# Patient Record
Sex: FEMALE | Race: BLACK OR AFRICAN AMERICAN | NOT HISPANIC OR LATINO
[De-identification: names, ages, dates, MRNs, and addresses within clinical notes are randomized per-mention and may not be internally consistent; named-entity substitution may affect disease eponyms.]

---

## 2016-12-17 RX ORDER — NAPROXEN SODIUM 275 MG
1 TABLET ORAL
Qty: 14 | Refills: 0 | DISCHARGE
Start: 2016-12-17 | End: 2016-12-24

## 2018-04-26 ENCOUNTER — RESULT REVIEW (OUTPATIENT)
Age: 58
End: 2018-04-26

## 2020-04-25 ENCOUNTER — MESSAGE (OUTPATIENT)
Age: 60
End: 2020-04-25

## 2020-05-07 LAB
SARS-COV-2 IGG SERPL IA-ACNC: <0.1 INDEX
SARS-COV-2 IGG SERPL QL IA: NEGATIVE

## 2020-10-23 ENCOUNTER — RESULT REVIEW (OUTPATIENT)
Age: 60
End: 2020-10-23

## 2021-01-30 ENCOUNTER — APPOINTMENT (OUTPATIENT)
Dept: MAMMOGRAPHY | Facility: IMAGING CENTER | Age: 61
End: 2021-01-30
Payer: COMMERCIAL

## 2021-01-30 ENCOUNTER — OUTPATIENT (OUTPATIENT)
Dept: OUTPATIENT SERVICES | Facility: HOSPITAL | Age: 61
LOS: 1 days | End: 2021-01-30
Payer: COMMERCIAL

## 2021-01-30 DIAGNOSIS — Z00.8 ENCOUNTER FOR OTHER GENERAL EXAMINATION: ICD-10-CM

## 2021-01-30 PROCEDURE — 76641 ULTRASOUND BREAST COMPLETE: CPT | Mod: 26,50

## 2021-01-30 PROCEDURE — 77067 SCR MAMMO BI INCL CAD: CPT | Mod: 26

## 2021-01-30 PROCEDURE — 77067 SCR MAMMO BI INCL CAD: CPT

## 2021-01-30 PROCEDURE — 76641 ULTRASOUND BREAST COMPLETE: CPT

## 2021-01-30 PROCEDURE — 77063 BREAST TOMOSYNTHESIS BI: CPT | Mod: 26

## 2021-01-30 PROCEDURE — 77063 BREAST TOMOSYNTHESIS BI: CPT

## 2021-02-04 ENCOUNTER — APPOINTMENT (OUTPATIENT)
Dept: MAMMOGRAPHY | Facility: IMAGING CENTER | Age: 61
End: 2021-02-04

## 2022-02-01 ENCOUNTER — TRANSCRIPTION ENCOUNTER (OUTPATIENT)
Age: 62
End: 2022-02-01

## 2022-11-21 ENCOUNTER — APPOINTMENT (OUTPATIENT)
Dept: MAMMOGRAPHY | Facility: IMAGING CENTER | Age: 62
End: 2022-11-21

## 2022-11-21 ENCOUNTER — OUTPATIENT (OUTPATIENT)
Dept: OUTPATIENT SERVICES | Facility: HOSPITAL | Age: 62
LOS: 1 days | End: 2022-11-21
Payer: COMMERCIAL

## 2022-11-21 ENCOUNTER — APPOINTMENT (OUTPATIENT)
Dept: ULTRASOUND IMAGING | Facility: IMAGING CENTER | Age: 62
End: 2022-11-21

## 2022-11-21 DIAGNOSIS — Z00.8 ENCOUNTER FOR OTHER GENERAL EXAMINATION: ICD-10-CM

## 2022-11-21 PROCEDURE — 76641 ULTRASOUND BREAST COMPLETE: CPT | Mod: 26,50

## 2022-11-21 PROCEDURE — 77067 SCR MAMMO BI INCL CAD: CPT

## 2022-11-21 PROCEDURE — 77063 BREAST TOMOSYNTHESIS BI: CPT

## 2022-11-21 PROCEDURE — 77063 BREAST TOMOSYNTHESIS BI: CPT | Mod: 26

## 2022-11-21 PROCEDURE — 76641 ULTRASOUND BREAST COMPLETE: CPT

## 2022-11-21 PROCEDURE — 77067 SCR MAMMO BI INCL CAD: CPT | Mod: 26

## 2022-12-07 ENCOUNTER — APPOINTMENT (OUTPATIENT)
Dept: MAMMOGRAPHY | Facility: IMAGING CENTER | Age: 62
End: 2022-12-07

## 2022-12-07 ENCOUNTER — APPOINTMENT (OUTPATIENT)
Dept: ULTRASOUND IMAGING | Facility: IMAGING CENTER | Age: 62
End: 2022-12-07

## 2022-12-07 ENCOUNTER — OUTPATIENT (OUTPATIENT)
Dept: OUTPATIENT SERVICES | Facility: HOSPITAL | Age: 62
LOS: 1 days | End: 2022-12-07
Payer: COMMERCIAL

## 2022-12-07 DIAGNOSIS — Z00.8 ENCOUNTER FOR OTHER GENERAL EXAMINATION: ICD-10-CM

## 2022-12-07 PROCEDURE — 76642 ULTRASOUND BREAST LIMITED: CPT | Mod: 26,RT

## 2022-12-07 PROCEDURE — G0279: CPT | Mod: 26

## 2022-12-07 PROCEDURE — G0279: CPT

## 2022-12-07 PROCEDURE — 77065 DX MAMMO INCL CAD UNI: CPT | Mod: 26,RT

## 2022-12-07 PROCEDURE — 77065 DX MAMMO INCL CAD UNI: CPT

## 2022-12-07 PROCEDURE — 76642 ULTRASOUND BREAST LIMITED: CPT

## 2022-12-16 ENCOUNTER — OUTPATIENT (OUTPATIENT)
Dept: OUTPATIENT SERVICES | Facility: HOSPITAL | Age: 62
LOS: 1 days | End: 2022-12-16
Payer: COMMERCIAL

## 2022-12-16 ENCOUNTER — APPOINTMENT (OUTPATIENT)
Dept: ULTRASOUND IMAGING | Facility: IMAGING CENTER | Age: 62
End: 2022-12-16
Payer: COMMERCIAL

## 2022-12-16 DIAGNOSIS — Z00.8 ENCOUNTER FOR OTHER GENERAL EXAMINATION: ICD-10-CM

## 2022-12-16 PROCEDURE — A4648: CPT

## 2022-12-16 PROCEDURE — 10035 PLMT SFT TISS LOCLZJ DEV 1ST: CPT | Mod: RT

## 2022-12-16 PROCEDURE — 19083 BX BREAST 1ST LESION US IMAG: CPT | Mod: RT

## 2022-12-16 PROCEDURE — 88305 TISSUE EXAM BY PATHOLOGIST: CPT

## 2022-12-16 PROCEDURE — 77065 DX MAMMO INCL CAD UNI: CPT | Mod: 26,RT

## 2022-12-16 PROCEDURE — 77065 DX MAMMO INCL CAD UNI: CPT

## 2022-12-16 PROCEDURE — 88305 TISSUE EXAM BY PATHOLOGIST: CPT | Mod: 26

## 2022-12-16 PROCEDURE — 38505 NEEDLE BIOPSY LYMPH NODES: CPT | Mod: RT

## 2022-12-16 PROCEDURE — 19084 BX BREAST ADD LESION US IMAG: CPT

## 2022-12-16 PROCEDURE — 19084 BX BREAST ADD LESION US IMAG: CPT | Mod: RT

## 2022-12-16 PROCEDURE — 19083 BX BREAST 1ST LESION US IMAG: CPT

## 2023-03-24 ENCOUNTER — OUTPATIENT (OUTPATIENT)
Dept: OUTPATIENT SERVICES | Facility: HOSPITAL | Age: 63
LOS: 1 days | End: 2023-03-24
Payer: COMMERCIAL

## 2023-03-24 ENCOUNTER — APPOINTMENT (OUTPATIENT)
Dept: ULTRASOUND IMAGING | Facility: IMAGING CENTER | Age: 63
End: 2023-03-24

## 2023-03-24 DIAGNOSIS — Z00.8 ENCOUNTER FOR OTHER GENERAL EXAMINATION: ICD-10-CM

## 2023-03-24 PROCEDURE — 76830 TRANSVAGINAL US NON-OB: CPT | Mod: 26

## 2023-03-24 PROCEDURE — 76856 US EXAM PELVIC COMPLETE: CPT

## 2023-03-24 PROCEDURE — 76830 TRANSVAGINAL US NON-OB: CPT

## 2023-03-24 PROCEDURE — 76856 US EXAM PELVIC COMPLETE: CPT | Mod: 26

## 2023-03-24 PROCEDURE — 76700 US EXAM ABDOM COMPLETE: CPT

## 2023-03-24 PROCEDURE — 76700 US EXAM ABDOM COMPLETE: CPT | Mod: 26

## 2023-09-07 ENCOUNTER — NON-APPOINTMENT (OUTPATIENT)
Age: 63
End: 2023-09-07

## 2023-12-11 ENCOUNTER — APPOINTMENT (OUTPATIENT)
Dept: ULTRASOUND IMAGING | Facility: IMAGING CENTER | Age: 63
End: 2023-12-11
Payer: COMMERCIAL

## 2023-12-11 ENCOUNTER — APPOINTMENT (OUTPATIENT)
Dept: RADIOLOGY | Facility: IMAGING CENTER | Age: 63
End: 2023-12-11
Payer: COMMERCIAL

## 2023-12-11 ENCOUNTER — OUTPATIENT (OUTPATIENT)
Dept: OUTPATIENT SERVICES | Facility: HOSPITAL | Age: 63
LOS: 1 days | End: 2023-12-11
Payer: COMMERCIAL

## 2023-12-11 ENCOUNTER — APPOINTMENT (OUTPATIENT)
Dept: MAMMOGRAPHY | Facility: IMAGING CENTER | Age: 63
End: 2023-12-11
Payer: COMMERCIAL

## 2023-12-11 DIAGNOSIS — Z12.31 ENCOUNTER FOR SCREENING MAMMOGRAM FOR MALIGNANT NEOPLASM OF BREAST: ICD-10-CM

## 2023-12-11 DIAGNOSIS — Z13.820 ENCOUNTER FOR SCREENING FOR OSTEOPOROSIS: ICD-10-CM

## 2023-12-11 DIAGNOSIS — R92.8 OTHER ABNORMAL AND INCONCLUSIVE FINDINGS ON DIAGNOSTIC IMAGING OF BREAST: ICD-10-CM

## 2023-12-11 PROCEDURE — 77080 DXA BONE DENSITY AXIAL: CPT | Mod: 26

## 2023-12-11 PROCEDURE — 77066 DX MAMMO INCL CAD BI: CPT

## 2023-12-11 PROCEDURE — 76641 ULTRASOUND BREAST COMPLETE: CPT

## 2023-12-11 PROCEDURE — 77080 DXA BONE DENSITY AXIAL: CPT

## 2023-12-11 PROCEDURE — 77066 DX MAMMO INCL CAD BI: CPT | Mod: 26

## 2023-12-11 PROCEDURE — 76641 ULTRASOUND BREAST COMPLETE: CPT | Mod: 26,50

## 2023-12-11 PROCEDURE — G0279: CPT | Mod: 26

## 2023-12-11 PROCEDURE — G0279: CPT

## 2023-12-20 ENCOUNTER — NON-APPOINTMENT (OUTPATIENT)
Age: 63
End: 2023-12-20

## 2024-04-12 ENCOUNTER — NON-APPOINTMENT (OUTPATIENT)
Age: 64
End: 2024-04-12

## 2024-06-24 ENCOUNTER — APPOINTMENT (OUTPATIENT)
Dept: ORTHOPEDIC SURGERY | Facility: CLINIC | Age: 64
End: 2024-06-24
Payer: COMMERCIAL

## 2024-06-24 VITALS — HEIGHT: 66 IN | BODY MASS INDEX: 36.96 KG/M2 | WEIGHT: 230 LBS

## 2024-06-24 DIAGNOSIS — E78.00 PURE HYPERCHOLESTEROLEMIA, UNSPECIFIED: ICD-10-CM

## 2024-06-24 DIAGNOSIS — Z87.891 PERSONAL HISTORY OF NICOTINE DEPENDENCE: ICD-10-CM

## 2024-06-24 DIAGNOSIS — I10 ESSENTIAL (PRIMARY) HYPERTENSION: ICD-10-CM

## 2024-06-24 DIAGNOSIS — R73.03 PREDIABETES.: ICD-10-CM

## 2024-06-24 DIAGNOSIS — M51.36 OTHER INTERVERTEBRAL DISC DEGENERATION, LUMBAR REGION: ICD-10-CM

## 2024-06-24 DIAGNOSIS — M54.12 RADICULOPATHY, CERVICAL REGION: ICD-10-CM

## 2024-06-24 PROCEDURE — 72040 X-RAY EXAM NECK SPINE 2-3 VW: CPT

## 2024-06-24 PROCEDURE — 99204 OFFICE O/P NEW MOD 45 MIN: CPT

## 2024-06-24 PROCEDURE — 72110 X-RAY EXAM L-2 SPINE 4/>VWS: CPT

## 2024-06-24 RX ORDER — AMLODIPINE BESYLATE 5 MG/1
TABLET ORAL
Refills: 0 | Status: ACTIVE | COMMUNITY

## 2024-06-24 RX ORDER — LOVASTATIN 40 MG/1
TABLET ORAL
Refills: 0 | Status: ACTIVE | COMMUNITY

## 2024-06-24 RX ORDER — METHYLPREDNISOLONE 4 MG/1
4 TABLET ORAL
Qty: 1 | Refills: 0 | Status: ACTIVE | COMMUNITY
Start: 2024-06-24 | End: 1900-01-01

## 2024-06-27 ENCOUNTER — APPOINTMENT (OUTPATIENT)
Dept: MRI IMAGING | Facility: CLINIC | Age: 64
End: 2024-06-27
Payer: COMMERCIAL

## 2024-06-27 PROCEDURE — 72148 MRI LUMBAR SPINE W/O DYE: CPT

## 2024-06-27 PROCEDURE — 72141 MRI NECK SPINE W/O DYE: CPT

## 2024-07-03 ENCOUNTER — APPOINTMENT (OUTPATIENT)
Dept: PHYSICAL MEDICINE AND REHAB | Facility: CLINIC | Age: 64
End: 2024-07-03

## 2024-07-08 ENCOUNTER — APPOINTMENT (OUTPATIENT)
Dept: ORTHOPEDIC SURGERY | Facility: CLINIC | Age: 64
End: 2024-07-08
Payer: COMMERCIAL

## 2024-07-08 VITALS — HEIGHT: 66 IN | WEIGHT: 230 LBS | BODY MASS INDEX: 36.96 KG/M2

## 2024-07-08 DIAGNOSIS — M54.12 RADICULOPATHY, CERVICAL REGION: ICD-10-CM

## 2024-07-08 DIAGNOSIS — M51.36 OTHER INTERVERTEBRAL DISC DEGENERATION, LUMBAR REGION: ICD-10-CM

## 2024-07-08 PROCEDURE — 99213 OFFICE O/P EST LOW 20 MIN: CPT

## 2024-07-08 RX ORDER — CYCLOBENZAPRINE HYDROCHLORIDE 10 MG/1
10 TABLET, FILM COATED ORAL
Qty: 30 | Refills: 1 | Status: ACTIVE | COMMUNITY
Start: 2024-07-08 | End: 1900-01-01

## 2024-07-20 ENCOUNTER — NON-APPOINTMENT (OUTPATIENT)
Age: 64
End: 2024-07-20

## 2024-07-22 ENCOUNTER — TRANSCRIPTION ENCOUNTER (OUTPATIENT)
Age: 64
End: 2024-07-22

## 2024-08-24 ENCOUNTER — NON-APPOINTMENT (OUTPATIENT)
Age: 64
End: 2024-08-24

## 2024-08-26 ENCOUNTER — APPOINTMENT (OUTPATIENT)
Dept: ORTHOPEDIC SURGERY | Facility: CLINIC | Age: 64
End: 2024-08-26
Payer: COMMERCIAL

## 2024-08-26 VITALS — BODY MASS INDEX: 36.96 KG/M2 | WEIGHT: 230 LBS | HEIGHT: 66 IN

## 2024-08-26 DIAGNOSIS — M51.36 OTHER INTERVERTEBRAL DISC DEGENERATION, LUMBAR REGION: ICD-10-CM

## 2024-08-26 PROCEDURE — 99213 OFFICE O/P EST LOW 20 MIN: CPT

## 2024-08-26 RX ORDER — MELOXICAM 15 MG/1
15 TABLET ORAL DAILY
Qty: 30 | Refills: 0 | Status: ACTIVE | COMMUNITY
Start: 2024-08-26 | End: 1900-01-01

## 2024-08-26 NOTE — HISTORY OF PRESENT ILLNESS
[Neck] : neck [Lower back] : lower back [0] : 0 [] : yes [de-identified] : 08/26/24: Patient here for neck and lower back. Patient is in a lot of pain today, especially in the neck area. States that it has gotten worse since her last visit.  07/08/2024: MRI review of cervical and lumbar spine today. Took MDP with some relief. Reports no pain today.   06/24/2024: 64-year-old LHD female presenting with neck, upper and lower back pain.  Patient with !one month history of left sided neck pain that radiates to scapular and LUE. Patient reports having pain into left hand. Denies change in dexterity or fine motor skills. Patient went to , ED and followed up with PCP, who prescribed flexeril and naproxen which is helpful briefly. Patient with chronic low back pain and BLE radicular pain that has been managed with PT, LESI and muscle relaxants. Patient reports symptoms she continues to have persistent low back pain that continues. Patient does reports buckling at times when walking long distances. Denies radicular pain, n/t. Denies changes in b/b function.   PmHX: HTN, HLD, Pre-DM (A1c 6.3) All: NKDA Occupation: Phlebotomist

## 2024-08-26 NOTE — DATA REVIEWED
[Cervical Spine] : cervical spine [MRI] : MRI [Lumbar Spine] : lumbar spine [I independently reviewed and interpreted images and report] : I independently reviewed and interpreted images and report [FreeTextEntry1] : multilevel degen change with cord compression at  C32-4 without cord signal change. Multilevel foraminal stenosis.  [FreeTextEntry2] : multilevel degen changes in lower lumbar spine with moderate to sever snteosit. L3-4 spondylolisthesis

## 2024-08-26 NOTE — DATA REVIEWED
[Cervical Spine] : cervical spine [MRI] : MRI [Lumbar Spine] : lumbar spine [I independently reviewed and interpreted images and report] : I independently reviewed and interpreted images and report [FreeTextEntry1] : multilevel degen change with cord compression at  C32-4 without cord signal change. Multilevel foraminal stenosis.  [FreeTextEntry2] : multilevel degen changes in lower lumbar spine with moderate to sever snteosit. L3-4 spondylolisthesis Patient requests all Lab and Radiology Results on their Discharge Instructions

## 2024-08-26 NOTE — IMAGING
[Facet arthropathy] : Facet arthropathy [Disc space narrowing] : Disc space narrowing [de-identified] : Spine: Inspection/Palpation: No tenderness to palpation throughout Cervical/thoracic/lumbar spine. No bony stepoffs, No lesions.   Gait: Non-antalgic,     Neurologic: Bilateral upper extremities 5/5 Deltoid/Biceps/Triceps/ Wrist Flexion/Wrist Extension/ / Intrinsics Except Bilateral Lower Extremities 5/5 Iliopsoas/Quadriceps/Hamstrings/ Tibialis Anterior/ Gastrocnemius. Extensor Hallucis Longus/ Flexor Hallucis Longus except   Sensation intact to light touch C5-T1 Sensation intact to light touch L2-S1    Negative Heart's,   [FreeTextEntry1] : degen changes worst at L4-5 and L5-s1

## 2024-08-26 NOTE — HISTORY OF PRESENT ILLNESS
[Neck] : neck [Lower back] : lower back [0] : 0 [] : yes [de-identified] : 08/26/24: Patient here for neck and lower back. Patient is in a lot of pain today, especially in the neck area. States that it has gotten worse since her last visit.  07/08/2024: MRI review of cervical and lumbar spine today. Took MDP with some relief. Reports no pain today.   06/24/2024: 64-year-old LHD female presenting with neck, upper and lower back pain.  Patient with !one month history of left sided neck pain that radiates to scapular and LUE. Patient reports having pain into left hand. Denies change in dexterity or fine motor skills. Patient went to , ED and followed up with PCP, who prescribed flexeril and naproxen which is helpful briefly. Patient with chronic low back pain and BLE radicular pain that has been managed with PT, LESI and muscle relaxants. Patient reports symptoms she continues to have persistent low back pain that continues. Patient does reports buckling at times when walking long distances. Denies radicular pain, n/t. Denies changes in b/b function.   PmHX: HTN, HLD, Pre-DM (A1c 6.3) All: NKDA Occupation: Phlebotomist

## 2024-08-26 NOTE — ASSESSMENT
[FreeTextEntry1] : 64F with neck pain  and LUE radic likely C7 and Low back pain and spasm 'Has cord compression but no symptoms from it. Only radicular complaints. Discussed will need surveillance MRI in 1 year and if she develops any signs of myelopathy would require a decompression.  PT c/w nsaids has rx from pmd.   Discussed major side effects of medication including but not limited to gastritis and acute kidney injury.  She was instructed to take with food and to discontinue use if stomach or esophageal pain developed.  Pain management referral for PAMELA

## 2024-08-26 NOTE — IMAGING
[Facet arthropathy] : Facet arthropathy [Disc space narrowing] : Disc space narrowing [de-identified] : Spine: Inspection/Palpation: No tenderness to palpation throughout Cervical/thoracic/lumbar spine. No bony stepoffs, No lesions.   Gait: Non-antalgic,     Neurologic: Bilateral upper extremities 5/5 Deltoid/Biceps/Triceps/ Wrist Flexion/Wrist Extension/ / Intrinsics Except Bilateral Lower Extremities 5/5 Iliopsoas/Quadriceps/Hamstrings/ Tibialis Anterior/ Gastrocnemius. Extensor Hallucis Longus/ Flexor Hallucis Longus except   Sensation intact to light touch C5-T1 Sensation intact to light touch L2-S1    Negative Heart's,   [FreeTextEntry1] : degen changes worst at L4-5 and L5-s1

## 2024-08-28 ENCOUNTER — APPOINTMENT (OUTPATIENT)
Dept: PAIN MANAGEMENT | Facility: CLINIC | Age: 64
End: 2024-08-28
Payer: COMMERCIAL

## 2024-08-28 VITALS — WEIGHT: 228 LBS | HEIGHT: 66 IN | BODY MASS INDEX: 36.64 KG/M2

## 2024-08-28 DIAGNOSIS — M54.12 RADICULOPATHY, CERVICAL REGION: ICD-10-CM

## 2024-08-28 PROCEDURE — 99204 OFFICE O/P NEW MOD 45 MIN: CPT

## 2024-08-28 RX ORDER — TRAMADOL HYDROCHLORIDE 50 MG/1
50 TABLET, COATED ORAL
Qty: 28 | Refills: 0 | Status: ACTIVE | COMMUNITY
Start: 2024-08-28 | End: 1900-01-01

## 2024-08-28 NOTE — HISTORY OF PRESENT ILLNESS
[Neck] : neck [Upper back] : upper back [9] : 9 [Radiating] : radiating [Sharp] : sharp [Shooting] : shooting [Tightness] : tightness [Constant] : constant [Household chores] : household chores [Leisure] : leisure [Sleep] : sleep [Nothing helps with pain getting better] : Nothing helps with pain getting better [Sitting] : sitting [Standing] : standing [Lying in bed] : lying in bed [] : no [FreeTextEntry1] : L shoulder  [de-identified] : x-ray and MRI

## 2024-08-28 NOTE — DATA REVIEWED
[MRI] : MRI [Cervical Spine] : cervical spine [Report was reviewed and noted in the chart] : The report was reviewed and noted in the chart [I independently reviewed and interpreted images and report] : I independently reviewed and interpreted images and report [FreeTextEntry1] : 6/27/2024 MRI Cervical Spine O&C C3-C4: broad based posterior disc herniation, moderate cord compression asymmetric on the RIGHT, bilateral exiting nerve root impingement w/ severe LEFT NF narrowing and moderate to severe right NF narrowing C4-C5: disc bulging, uncovertebral hypertrophy, moderate bilateral NF narrowing C5-C6: broad based posterior disc herniation encroaching on the right > left C6 nerve roots w/ mild central stenosis C6-C7: disc bulging, broad based disc herniation w/ mild central stenosis and encroachment on LEFT > right C7 nerve roots

## 2024-08-28 NOTE — PHYSICAL EXAM
[de-identified] : Gen: NAD Head: NC/AT Neck: patient with +spurling's on the LEFT, +myofascial TTP in the left suprascapular region  Eyes: wears glasses, no scleral icterus ENT: mucous membranes moist CV: no JVD Lungs: nonlabored breathing Abd: soft, NT/ND Ext: full ROM in all extremities, no peripheral edema Neuro: CN intact UEs +5 L +5 R shoulder abduction +5 L +5 R arm abduction +5 L +5 R forearm flexion +5 L +5 R forearm extension +5 L +5 R finger flexion +5 L +5 R  strength Psych: normal affect Skin: no visible lesions

## 2024-08-28 NOTE — DISCUSSION/SUMMARY
[de-identified] : DEMIAN RHOADES is a 64 year-old woman presenting for a NPV for a history of chronic neck pain.   Prior treatment: Meloxicam  Cyclobenzaprine TPI Oral steroid taper Tramadol Heat, rest, ice  Plan: 1) MRI cervical spine images reviewed with the patient. 2) Schedule C7-T1 MICHI w/ MAC. The procedure was explained to the patient in detail. Reviewed risks, benefits, and alternatives with the patient. Some risks discussed included temporary increase in pain, bleeding, infection, and side effects from steroids. The patient expressed understanding and would like to proceed.  3) [place]STOP PDMP was checked and appropriate. No opioid prescriptions, but patient gets medical marijuana.  4) ONE TIME prescription for tramadol 50mg #28 tabs 5) Discussed with the patient the risks and benefits from opioid use. Patient was instructed not to combine this medication with sedative medications, illicit drugs, or alcohol. Discussed with the patient that I WILL NOT write them for this medication chronically at this time and that this is a one-time prescription for their acute pain. 6) RTC post procedure

## 2024-09-11 ENCOUNTER — APPOINTMENT (OUTPATIENT)
Dept: PAIN MANAGEMENT | Facility: CLINIC | Age: 64
End: 2024-09-11
Payer: COMMERCIAL

## 2024-09-11 ENCOUNTER — APPOINTMENT (OUTPATIENT)
Dept: PAIN MANAGEMENT | Facility: CLINIC | Age: 64
End: 2024-09-11

## 2024-09-11 DIAGNOSIS — M54.12 RADICULOPATHY, CERVICAL REGION: ICD-10-CM

## 2024-09-11 PROCEDURE — 62321 NJX INTERLAMINAR CRV/THRC: CPT

## 2024-09-11 NOTE — PROCEDURE
[FreeTextEntry1] : C7-T1 cervical interlaminar epidural steroid injection [FreeTextEntry2] : chronic cervical radiculopathy [FreeTextEntry3] : Procedure Date: 09/11/2024   Preoperative Diagnosis: cervical radiculopathy   Procedure: C7-T1 epidural steroid injection under fluoroscopic guidance   Anesthesia: MAC   Complications: none   EBL: none   Procedure in detail: Patient was seen and examined. Risks, benefits, and alternatives for the procedure were discussed with the patient in detail. The patient expressed understanding, gave written and verbal consent, and placed themselves in a prone position on the procedure table. Skin overlying the posterior cervical spine was prepped with chloraprep and draped in the usual sterile fashion. Fluoroscopic images were obtained to identify the C7 and T1 vertebral body. Target was the interlaminar space between the C7 and T1 vertebral body. Skin overlying the target was marked and infiltrated with 1% lidocaine. Using an 20 gauge, 4.5 inch tuohy needle, this was inserted and advanced under intermittent fluoroscopic guidance. When felt to be engaged in the ligamentum flavum, contralateral oblique view was used to confirm depth. Needle then advanced using loss of resistance to saline technique until loss was achieved. After negative aspiration for heme/csf, contrast was injected under live fluoroscopy, which showed contrast spread consistent with epidural flow. No evidence of intravascular or intrathecal uptake. At this point, a total of 2ml of injectate, which consisted of 1ml of 80mg/ml depomedrol and 1ml of normal saline was injected. The needle was restyletted and withdrawn, a band aid was placed over the injection site. Patient tolerated the procedure well. The patient recovered uneventfully and was discharged home in stable condition.

## 2024-09-14 NOTE — HISTORY OF PRESENT ILLNESS
[Neck] : neck [Upper back] : upper back [9] : 9 [Radiating] : radiating [Sharp] : sharp [Shooting] : shooting [Tightness] : tightness [Constant] : constant [Household chores] : household chores [Leisure] : leisure [Sleep] : sleep [Nothing helps with pain getting better] : Nothing helps with pain getting better [Sitting] : sitting [Standing] : standing [Lying in bed] : lying in bed [] : no [FreeTextEntry1] : L shoulder  [de-identified] : x-ray and MRI

## 2024-09-14 NOTE — DISCUSSION/SUMMARY
[de-identified] : DEMIAN RHOADES is a 64 year-old woman presenting for a RPV for a history of chronic neck pain.   Prior treatment: Meloxicam  Cyclobenzaprine TPI Oral steroid taper Tramadol Heat, rest, ice  Plan: 1) MRI cervical spine images reviewed with the patient. 2) Schedule C7-T1 MICHI w/ MAC. The procedure was explained to the patient in detail. Reviewed risks, benefits, and alternatives with the patient. Some risks discussed included temporary increase in pain, bleeding, infection, and side effects from steroids. The patient expressed understanding and would like to proceed.  3) [place]STOP PDMP was checked and appropriate. No opioid prescriptions, but patient gets medical marijuana.  4) ONE TIME prescription for tramadol 50mg #28 tabs 5) Discussed with the patient the risks and benefits from opioid use. Patient was instructed not to combine this medication with sedative medications, illicit drugs, or alcohol. Discussed with the patient that I WILL NOT write them for this medication chronically at this time and that this is a one-time prescription for their acute pain. 6) RTC post procedure

## 2024-09-14 NOTE — PHYSICAL EXAM
[de-identified] : Gen: NAD Head: NC/AT Neck: patient with +spurling's on the LEFT, +myofascial TTP in the left suprascapular region  Eyes: wears glasses, no scleral icterus ENT: mucous membranes moist CV: no JVD Lungs: nonlabored breathing Abd: soft, NT/ND Ext: full ROM in all extremities, no peripheral edema Neuro: CN intact UEs +5 L +5 R shoulder abduction +5 L +5 R arm abduction +5 L +5 R forearm flexion +5 L +5 R forearm extension +5 L +5 R finger flexion +5 L +5 R  strength Psych: normal affect Skin: no visible lesions

## 2024-09-25 ENCOUNTER — APPOINTMENT (OUTPATIENT)
Dept: PAIN MANAGEMENT | Facility: CLINIC | Age: 64
End: 2024-09-25
Payer: COMMERCIAL

## 2024-09-25 VITALS — WEIGHT: 228 LBS | HEIGHT: 66 IN | BODY MASS INDEX: 36.64 KG/M2

## 2024-09-25 DIAGNOSIS — M54.12 RADICULOPATHY, CERVICAL REGION: ICD-10-CM

## 2024-09-25 PROCEDURE — 99214 OFFICE O/P EST MOD 30 MIN: CPT

## 2024-09-25 NOTE — PHYSICAL EXAM
[de-identified] : Gen: NAD Neck: +spurling's bilaterally Head: NC/AT Eyes: wears glasses, no scleral icterus ENT: mucous membranes moist CV: RRR, S1 S2, no mrg Lungs: CTAB, nonlabored breathing Abd: soft, NT/ND Ext: full ROM in all extremities, no peripheral edema Back: +TTP in the bilateral lumbar facet region, +facet loading on the left, +SLR bilaterally Neuro: CN intact UEs +5 L +5 R shoulder abduction +5 L +5 R arm abduction +5 L +5 R forearm flexion +5 L +5 R forearm extension +5 L +5 R finger flexion +5 L +5 R  strength LEs +5 L +5 R hip flexion +5 L +5 R leg extension +5 L +5 R leg flexion +5 L +5 R foot dorsiflexion +5 L +5 R foot plantarflexion +5 L +5 R EHL extension Psych: normal affect Skin: no visible lesions

## 2024-09-25 NOTE — DATA REVIEWED
[Cervical Spine] : cervical spine [MRI] : MRI [Lumbar Spine] : lumbar spine [Report was reviewed and noted in the chart] : The report was reviewed and noted in the chart [I independently reviewed and interpreted images and report] : I independently reviewed and interpreted images and report [FreeTextEntry1] : 6/27/2024 MRI Lumbar Spine O&C L2-L3: broad based disc herniation, moderate to severe bilateral facet arthrosis, moderate central stenosis, bilateral exiting L2 nerve root impingement L3-L4: broad based disc herniation, anterolisthesis, moderate to severe central stenosis bilateral facet arthrosis, moderate central stenosis, bilateral exiting L3 nerve root impingement RIGHT > left L4-L5: disc bulge, facet arthrosis, moderate central stenosis, bilateral L4 and L5 nerve root impingement L5-S1: disc bulging, facet arthrosis, posterior disc herniation, bilateral L5 and S1 nerve root impingement   6/27/2024 MRI Cervical Spine O&C C3-C4: broad based posterior disc herniation, moderate cord compression asymmetric on the RIGHT, bilateral exiting nerve root impingement w/ severe LEFT NF narrowing and moderate to severe right NF narrowing C4-C5: disc bulging, uncovertebral hypertrophy, moderate bilateral NF narrowing C5-C6: broad based posterior disc herniation encroaching on the right > left C6 nerve roots w/ mild central stenosis C6-C7: disc bulging, broad based disc herniation w/ mild central stenosis and encroachment on LEFT > right C7 nerve roots

## 2024-09-25 NOTE — DISCUSSION/SUMMARY
[de-identified] : DEMIAN RHOADES is a 64 year-old woman presenting for a RPV for a history of chronic neck pain.   Prior treatment: 9/11/2024 C7-T1 MICHI w/ MAC w/ 90% improvement of pain and function  Meloxicam  Cyclobenzaprine TPI Oral steroid taper Tramadol Heat, rest, ice  Plan: 1) MRI cervical/lumbar spine images reviewed with the patient. 2) Schedule L5-S1 ILESI w/ MAC. The procedure was explained to the patient in detail. Reviewed risks, benefits, and alternatives with the patient. Some risks discussed included temporary increase in pain, bleeding, infection, and side effects from steroids. The patient expressed understanding and would like to proceed. 3) Consider repeat C7-T1 MICHI w/ MAC in the future 4) Continue home exercise regimen 5) RTC post procedure

## 2024-09-25 NOTE — DISCUSSION/SUMMARY
[de-identified] : DEMIAN RHOADES is a 64 year-old woman presenting for a RPV for a history of chronic neck pain.   Prior treatment: 9/11/2024 C7-T1 MICHI w/ MAC w/ 90% improvement of pain and function  Meloxicam  Cyclobenzaprine TPI Oral steroid taper Tramadol Heat, rest, ice  Plan: 1) MRI cervical/lumbar spine images reviewed with the patient. 2) Schedule L5-S1 ILESI w/ MAC. The procedure was explained to the patient in detail. Reviewed risks, benefits, and alternatives with the patient. Some risks discussed included temporary increase in pain, bleeding, infection, and side effects from steroids. The patient expressed understanding and would like to proceed. 3) Consider repeat C7-T1 MICHI w/ MAC in the future 4) Continue home exercise regimen 5) RTC post procedure

## 2024-09-25 NOTE — PHYSICAL EXAM
[de-identified] : Gen: NAD Neck: +spurling's bilaterally Head: NC/AT Eyes: wears glasses, no scleral icterus ENT: mucous membranes moist CV: RRR, S1 S2, no mrg Lungs: CTAB, nonlabored breathing Abd: soft, NT/ND Ext: full ROM in all extremities, no peripheral edema Back: +TTP in the bilateral lumbar facet region, +facet loading on the left, +SLR bilaterally Neuro: CN intact UEs +5 L +5 R shoulder abduction +5 L +5 R arm abduction +5 L +5 R forearm flexion +5 L +5 R forearm extension +5 L +5 R finger flexion +5 L +5 R  strength LEs +5 L +5 R hip flexion +5 L +5 R leg extension +5 L +5 R leg flexion +5 L +5 R foot dorsiflexion +5 L +5 R foot plantarflexion +5 L +5 R EHL extension Psych: normal affect Skin: no visible lesions

## 2024-09-25 NOTE — DISCUSSION/SUMMARY
[de-identified] : DEMIAN RHOADES is a 64 year-old woman presenting for a RPV for a history of chronic neck pain.   Prior treatment: 9/11/2024 C7-T1 MICHI w/ MAC w/ 90% improvement of pain and function  Meloxicam  Cyclobenzaprine TPI Oral steroid taper Tramadol Heat, rest, ice  Plan: 1) MRI cervical/lumbar spine images reviewed with the patient. 2) Schedule L5-S1 ILESI w/ MAC. The procedure was explained to the patient in detail. Reviewed risks, benefits, and alternatives with the patient. Some risks discussed included temporary increase in pain, bleeding, infection, and side effects from steroids. The patient expressed understanding and would like to proceed. 3) Consider repeat C7-T1 MICHI w/ MAC in the future 4) Continue home exercise regimen 5) RTC post procedure

## 2024-09-25 NOTE — PHYSICAL EXAM
[de-identified] : Gen: NAD Neck: +spurling's bilaterally Head: NC/AT Eyes: wears glasses, no scleral icterus ENT: mucous membranes moist CV: RRR, S1 S2, no mrg Lungs: CTAB, nonlabored breathing Abd: soft, NT/ND Ext: full ROM in all extremities, no peripheral edema Back: +TTP in the bilateral lumbar facet region, +facet loading on the left, +SLR bilaterally Neuro: CN intact UEs +5 L +5 R shoulder abduction +5 L +5 R arm abduction +5 L +5 R forearm flexion +5 L +5 R forearm extension +5 L +5 R finger flexion +5 L +5 R  strength LEs +5 L +5 R hip flexion +5 L +5 R leg extension +5 L +5 R leg flexion +5 L +5 R foot dorsiflexion +5 L +5 R foot plantarflexion +5 L +5 R EHL extension Psych: normal affect Skin: no visible lesions

## 2024-09-25 NOTE — HISTORY OF PRESENT ILLNESS
[Neck] : neck [Upper back] : upper back [9] : 9 [Radiating] : radiating [Sharp] : sharp [Shooting] : shooting [Tightness] : tightness [Constant] : constant [Household chores] : household chores [Leisure] : leisure [Sleep] : sleep [Nothing helps with pain getting better] : Nothing helps with pain getting better [Sitting] : sitting [Standing] : standing [Lying in bed] : lying in bed [0] : 0 [] : no [FreeTextEntry1] : L shoulder  [de-identified] : x-ray and MRI [TWNoteComboBox1] : 90%

## 2024-09-25 NOTE — HISTORY OF PRESENT ILLNESS
[Neck] : neck [Upper back] : upper back [9] : 9 [Radiating] : radiating [Sharp] : sharp [Shooting] : shooting [Tightness] : tightness [Constant] : constant [Household chores] : household chores [Leisure] : leisure [Sleep] : sleep [Nothing helps with pain getting better] : Nothing helps with pain getting better [Sitting] : sitting [Standing] : standing [Lying in bed] : lying in bed [0] : 0 [] : no [FreeTextEntry1] : L shoulder  [de-identified] : x-ray and MRI [TWNoteComboBox1] : 90%

## 2024-10-02 ENCOUNTER — APPOINTMENT (OUTPATIENT)
Dept: PAIN MANAGEMENT | Facility: CLINIC | Age: 64
End: 2024-10-02
Payer: COMMERCIAL

## 2024-10-02 VITALS — HEIGHT: 66 IN | BODY MASS INDEX: 36.48 KG/M2 | WEIGHT: 227 LBS

## 2024-10-02 DIAGNOSIS — M51.369: ICD-10-CM

## 2024-10-02 PROCEDURE — 99214 OFFICE O/P EST MOD 30 MIN: CPT

## 2024-10-02 RX ORDER — BACLOFEN 10 MG/1
10 TABLET ORAL TWICE DAILY
Qty: 60 | Refills: 1 | Status: ACTIVE | COMMUNITY
Start: 2024-10-02 | End: 1900-01-01

## 2024-10-02 NOTE — PHYSICAL EXAM
[de-identified] : Gen: NAD Neck: +spurling's bilaterally Head: NC/AT Eyes: wears glasses, no scleral icterus ENT: mucous membranes moist CV: RRR, S1 S2, no mrg Lungs: CTAB, nonlabored breathing Abd: soft, NT/ND Ext: full ROM in all extremities, no peripheral edema Back: +TTP in the bilateral lumbar facet region, +facet loading on the left, +SLR bilaterally Neuro: CN intact UEs +5 L +5 R shoulder abduction +5 L +5 R arm abduction +5 L +5 R forearm flexion +5 L +5 R forearm extension +5 L +5 R finger flexion +5 L +5 R  strength LEs +5 L +5 R hip flexion +5 L +5 R leg extension +5 L +5 R leg flexion +5 L +5 R foot dorsiflexion +5 L +5 R foot plantarflexion +5 L +5 R EHL extension Psych: normal affect Skin: no visible lesions

## 2024-10-02 NOTE — HISTORY OF PRESENT ILLNESS
[Neck] : neck [Upper back] : upper back [0] : 0 [Radiating] : radiating [Sharp] : sharp [Shooting] : shooting [Tightness] : tightness [Constant] : constant [Household chores] : household chores [Leisure] : leisure [Sleep] : sleep [Nothing helps with pain getting better] : Nothing helps with pain getting better [Sitting] : sitting [Standing] : standing [Lying in bed] : lying in bed [10] : 10 [5] : 5 [] : no [FreeTextEntry1] : L shoulder  [de-identified] : x-ray and MRI [TWNoteComboBox1] : 90%

## 2024-10-02 NOTE — DISCUSSION/SUMMARY
[de-identified] : DEMIAN RHOADES is a 64 year-old woman presenting for a RPV for a history of chronic neck pain.   Prior treatment: 9/11/2024 C7-T1 MICHI w/ MAC w/ 90% improvement of pain and function  Meloxicam  Cyclobenzaprine TPI Oral steroid taper Tramadol Heat, rest, ice  Plan: 1) MRI cervical/lumbar spine images reviewed with the patient. 2) Schedule L5-S1 ILESI w/ MAC. The procedure was explained to the patient in detail. Reviewed risks, benefits, and alternatives with the patient. Some risks discussed included temporary increase in pain, bleeding, infection, and side effects from steroids. The patient expressed understanding and would like to proceed. 3) Consider repeat C7-T1 MICHI w/ MAC in the future 4) Trial baclofen 10mg BID prn; Discussed AEs, including sedation, dizziness, somnolence. Patient told to use caution when driving after taking the first few doses. 5) Continue home exercise regimen 6) RTC post procedure

## 2024-10-02 NOTE — DISCUSSION/SUMMARY
[de-identified] : DEMIAN RHOADES is a 64 year-old woman presenting for a RPV for a history of chronic neck pain.   Prior treatment: 9/11/2024 C7-T1 MICHI w/ MAC w/ 90% improvement of pain and function  Meloxicam  Cyclobenzaprine TPI Oral steroid taper Tramadol Heat, rest, ice  Plan: 1) MRI cervical/lumbar spine images reviewed with the patient. 2) Schedule L5-S1 ILESI w/ MAC. The procedure was explained to the patient in detail. Reviewed risks, benefits, and alternatives with the patient. Some risks discussed included temporary increase in pain, bleeding, infection, and side effects from steroids. The patient expressed understanding and would like to proceed. 3) Consider repeat C7-T1 MICHI w/ MAC in the future 4) Trial baclofen 10mg BID prn; Discussed AEs, including sedation, dizziness, somnolence. Patient told to use caution when driving after taking the first few doses. 5) Continue home exercise regimen 6) RTC post procedure

## 2024-10-02 NOTE — PHYSICAL EXAM
[de-identified] : Gen: NAD Neck: +spurling's bilaterally Head: NC/AT Eyes: wears glasses, no scleral icterus ENT: mucous membranes moist CV: RRR, S1 S2, no mrg Lungs: CTAB, nonlabored breathing Abd: soft, NT/ND Ext: full ROM in all extremities, no peripheral edema Back: +TTP in the bilateral lumbar facet region, +facet loading on the left, +SLR bilaterally Neuro: CN intact UEs +5 L +5 R shoulder abduction +5 L +5 R arm abduction +5 L +5 R forearm flexion +5 L +5 R forearm extension +5 L +5 R finger flexion +5 L +5 R  strength LEs +5 L +5 R hip flexion +5 L +5 R leg extension +5 L +5 R leg flexion +5 L +5 R foot dorsiflexion +5 L +5 R foot plantarflexion +5 L +5 R EHL extension Psych: normal affect Skin: no visible lesions

## 2024-10-02 NOTE — REVIEW OF SYSTEMS
Patient : Delilah Jim Age: 89 year old Sex: female   MRN: [id number] Encounter Date: 1/5/2022      History     Chief Complaint   Patient presents with   • Abdominal Pain     HPI    89-year-old female with history of osteoarthritis, hypertension, breast cancer status post mastectomy, completed by postmastectomy lymphedema syndrome, chronic opiate use, lumbar stenosis presents for evaluation of abdominal pain.  Patient states that for the past 3 days, she has had left-sided abdominal pain.  Pain is dull/aching in nature, intermittent.  Denies associated fevers or chills.  Does have intermittent nausea, no vomiting.  Last bowel movement was yesterday.  Has had difficulty tolerating p.o.  Denies any chest pain or shortness of breath.  No lightheadedness or syncope.  No bloody or black stools.  No diarrhea.  Patient is fully vaccinated.  No other complaints at this time.    PMH: Osteoarthritis, hypertension, breast cancer  PSH: Mastectomy  SH: Denies tobacco, EtOH, drugs    Allergies   Allergen Reactions   • Adhesive   (Environmental) Other (See Comments)     unknown   • Ciprofloxacin Other (See Comments)     unknown   • Latex Other (See Comments)       Current Discharge Medication List      Prior to Admission Medications    Details   docusate sodium-sennosides (SENOKOT S) 50-8.6 MG per tablet Take 2 tablets by mouth 2 times daily. Take 2 tablets by mouth bid pen  Qty:        HYDROcodone-acetaminophen (NORCO)  MG per tablet Take 2 tablets by mouth every 6 hours as needed for Pain.  Qty: 45 tablet, Refills: 0      fentaNYL (DURAGESIC) 12 MCG/HR patch Place 1 patch onto the skin every 72 hours.  Qty: 10 patch, Refills: 0      fentaNYL (DURAGESIC) 100 MCG/HR patch Place 1 patch onto the skin every 72 hours.  Qty: 10 patch, Refills: 0      gabapentin (NEURONTIN) 400 MG capsule Take 1 capsule by mouth 3 times daily for 2 days.  Qty: 6 capsule, Refills: 0      acetaminophen (TYLENOL) 325 MG tablet Take 2 tablets by  mouth every 4 hours as needed for Pain or Fever.  Qty:        bisacodyl (DULCOLAX) 10 MG suppository Place 1 suppository rectally daily as needed for Constipation.  Qty:        enoxaparin (LOVENOX) 40 MG/0.4ML injectable solution Inject 0.4 mLs into the skin daily. Do not start before January 15, 2021.  Qty:        nystatin (MYCOSTATIN) 267080 UNIT/GM powder Apply topically 3 times daily.  Qty: 30 g, Refills: 0      polyethylene glycol (MIRALAX) 17 g packet Take 17 g by mouth daily. Do not start before January 15, 2021. Stir and dissolve powder in any 4 to 8 ounces of beverage, then drink.  Qty:        furosemide (LASIX) 40 MG tablet TAKE 1 & 1/2 TABLET BY MOUTH DAILY  Qty: 135 tablet, Refills: 3      simvastatin (ZOCOR) 40 MG tablet TAKE 1 TABLET BY MOUTH EVERY DAY IN THE EVENING  Qty: 30 tablet, Refills: 3      blood glucose (OneTouch Ultra) test strip Test once daily. Dx: E11.9 Non-Insulin dependant  Qty: 100 strip, Refills: 1      OneTouch Delica Lancets 30G Misc 1 each daily. Test once daily. Dx: E11.9 Non-Insulin dependant  Qty: 100 each, Refills: 1      metoPROLOL tartrate (LOPRESSOR) 25 MG tablet TAKE 1 TABLET BY MOUTH in the morning, and 1/2 tablet in the evening  Qty: 180 tablet, Refills: 3      lisinopril (ZESTRIL) 20 MG tablet Take 1 tablet by mouth daily.  Qty: 90 tablet, Refills: 3      naLOXone (NARCAN) 4 MG/0.1ML nasal spray Call 911. [place] the content of 1 device into 1 nostril. May repeat with 2nd device in alternate nostril if no response in 2-3 minutes.  Qty: 2 each, Refills: 1      pantoprazole (PROTONIX) 40 MG tablet TAKE 1 TABLET BY MOUTH TWICE A DAY  Qty: 180 tablet, Refills: 1      ferrous sulfate 325 (65 FE) MG tablet Take 1 tablet by mouth daily. (per pt 5Gr/325mg)      aspirin (ASPIRIN 81) 81 MG EC tablet Take 1 tablet by mouth daily.  Qty: 1 tablet, Refills: 0      Multiple Vitamins-Minerals (MULTIVITAMIN ADULTS) Tab Take 1 tablet by mouth daily.             Current Discharge Medication  List          Past Medical History:   Diagnosis Date   • Chronic pain    • Diabetes mellitus (CMS/HCC)    • Essential (primary) hypertension        No past surgical history on file.    No family history on file.    Social History     Tobacco Use   • Smoking status: Never Smoker   • Smokeless tobacco: Never Used   Substance Use Topics   • Alcohol use: No   • Drug use: Never       Review of Systems    Constitutional: Denies fevers or chills  Eyes: Denies vision changes, eye pain  ENMT: Denies sore throat, nasal congestion  CV: Denies chest pain, palpitations, syncope  Resp: Denies SOB, cough, wheezing  GI: Denies vomiting or diarrhea, + abdominal pain, no blood in the stool  : Denies dysuria, hematuria  MSK: Denies myalgias, arthralgias, swelling  Skin: Denies rashes, lesions  Neuro: Denies numbness, tingling, weakness    Physical Exam     ED Triage Vitals [01/05/22 1248]   ED Triage Vitals Group      Temp 98.6 °F (37 °C)      Heart Rate 85      Resp 18      BP (!) 177/80      SpO2 98 %      EtCO2 mmHg       Height 5' 1\" (1.549 m)      Weight 146 lb 9.7 oz (66.5 kg)      Weight Scale Used Scale in bed      BMI (Calculated) 27.7      IBW/kg (Calculated) 47.8       Physical Exam    Constitutional: Awake, alert, NAD  Head: Atraumatic, normocephalic  Eyes: PERRL, EOMI, sclera non-icteric  Neck: Supple, trachea midline  CVS: RRR, no murmurs, 2+ radial pulses  Respiratory/chest: No respiratory distress, breathing not labored, normal chest rise, lungs CTA bilaterally, no wheezing  Abdomen: soft, non-distended, mild tenderness to palpation in the left upper and left lower quadrant without rebound/guarding/rigidity  Back: No step-offs, no CVA tenderness  Neuro: A&Ox3, no facial droop, normal speech, moves all extremities equally, sensation grossly intact  Extremities: Normal ROM, bilateral lower extremity swelling, no deformities  Skin: Warm, dry  Psych: Cooperative, normal mood, normal affect    Lab Results     Results for  orders placed or performed during the hospital encounter of 01/05/22   Comprehensive Metabolic Panel   Result Value Ref Range    Fasting Status      Sodium 137 135 - 145 mmol/L    Potassium 4.6 3.4 - 5.1 mmol/L    Chloride 101 98 - 107 mmol/L    Carbon Dioxide 31 21 - 32 mmol/L    Anion Gap 10 10 - 20 mmol/L    Glucose 162 (H) 70 - 99 mg/dL    BUN 25 (H) 6 - 20 mg/dL    Creatinine 0.60 0.51 - 0.95 mg/dL    Glomerular Filtration Rate 81 >=60    BUN/ Creatinine Ratio 42 (H) 7 - 25    Calcium 9.5 8.4 - 10.2 mg/dL    Bilirubin, Total 1.1 (H) 0.2 - 1.0 mg/dL    GOT/AST 31 <=37 Units/L    GPT/ALT 31 <64 Units/L    Alkaline Phosphatase 80 45 - 117 Units/L    Albumin 2.8 (L) 3.6 - 5.1 g/dL    Protein, Total 6.7 6.4 - 8.2 g/dL    Globulin 3.9 2.0 - 4.0 g/dL    A/G Ratio 0.7 (L) 1.0 - 2.4   TROPONIN I, HIGH SENSITIVITY   Result Value Ref Range    Troponin I, High Sensitivity 351 (HH) <52 ng/L   Lactic Acid, Venous   Result Value Ref Range    Lactate, Venous 1.8 0.0 - 2.0 mmol/L   Magnesium   Result Value Ref Range    Magnesium 2.3 1.7 - 2.4 mg/dL   Lipase   Result Value Ref Range    Lipase <50 (L) 73 - 393 Units/L   Urinalysis & Reflex Microscopy With Culture If Indicated   Result Value Ref Range    COLOR, URINALYSIS Susan     APPEARANCE, URINALYSIS Cloudy     GLUCOSE, URINALYSIS Negative Negative mg/dL    BILIRUBIN, URINALYSIS Negative Negative    KETONES, URINALYSIS Trace (A) Negative mg/dL    SPECIFIC GRAVITY, URINALYSIS >1.030 (H) 1.005 - 1.030    OCCULT BLOOD, URINALYSIS Moderate (A) Negative    PH, URINALYSIS 7.0 5.0 - 7.0    PROTEIN, URINALYSIS 100  (A) Negative mg/dL    UROBILINOGEN, URINALYSIS 1.0 0.2, 1.0 mg/dL    NITRITE, URINALYSIS Positive (A) Negative    LEUKOCYTE ESTERASE, URINALYSIS Large (A) Negative    SQUAMOUS EPITHELIAL, URINALYSIS 1 to 5 None Seen, 1 to 5 /hpf    ERYTHROCYTES, URINALYSIS 11 to 25 (A) None Seen, 1 to 2 /hpf    LEUKOCYTES, URINALYSIS >100 (A) None Seen, 1 to 5 /hpf    BACTERIA, URINALYSIS  Large (A) None Seen /hpf    HYALINE CASTS, URINALYSIS None Seen None Seen, 1 to 5 /lpf    MUCUS Present    CBC with Automated Differential (performable only)   Result Value Ref Range    WBC 7.1 4.2 - 11.0 K/mcL    RBC 4.95 4.00 - 5.20 mil/mcL    HGB 16.1 (H) 12.0 - 15.5 g/dL    HCT 48.9 (H) 36.0 - 46.5 %    MCV 98.8 78.0 - 100.0 fl    MCH 32.5 26.0 - 34.0 pg    MCHC 32.9 32.0 - 36.5 g/dL    RDW-CV 14.2 11.0 - 15.0 %    RDW-SD 51.5 (H) 39.0 - 50.0 fL     140 - 450 K/mcL    NRBC 0 <=0 /100 WBC    Neutrophil, Percent 84 %    Lymphocytes, Percent 8 %    Mono, Percent 8 %    Eosinophils, Percent 0 %    Basophils, Percent 0 %    Immature Granulocytes 0 %    Absolute Neutrophils 6.0 1.8 - 7.7 K/mcL    Absolute Lymphocytes 0.5 (L) 1.0 - 4.0 K/mcL    Absolute Monocytes 0.5 0.3 - 0.9 K/mcL    Absolute Eosinophils  0.0 0.0 - 0.5 K/mcL    Absolute Basophils 0.0 0.0 - 0.3 K/mcL    Absolute Immmature Granulocytes 0.0 0.0 - 0.2 K/mcL   Rapid SARS-CoV-2 by PCR    Specimen: Nasal, Mid-turbinate; Swab   Result Value Ref Range    Rapid SARS-COV-2 by PCR Not Detected Not Detected / Detected / Presumptive Positive / Inhibitors present    Isolation Guidelines      Procedural Comment         EKG Results     EKG Interpretation  Rate: 89  Rhythm: Normal sinus, right bundle branch block, no ST elevations, diffuse T wave inversions, ST depressions noted in V4 to V6, similar to prior EKGs    EKG tracing interpreted by ED physician    Radiology Results     Imaging Results          CT ABDOMEN PELVIS W CONTRAST - IV contrast only (Final result)  Result time 01/05/22 15:53:17    Final result                 Impression:    1.   Small bowel obstruction, with the majority of the small bowel loops  within a large ventral hernia. Transitional point is within the hernia,  subjacent to the peritoneal reflection near the umbilicus.  2.   Findings likely representing stercoral colitis, with large formed  stool in the rectal vault. This should be  disimpacted.  3.   Severe biliary tree dilation, and dilation of what is possibly the  gallbladder. Abrupt caliber change of the distal common bile duct at the  ampulla. This could be due to to an obstructing lesion or calculus, and can  be further evaluated with MRCP, when clinically appropriate.  4.   Multiple scattered hypodense pancreatic lesions are indeterminate, and  also can be further characterized with MRCP.  5.   Bibasilar groundglass opacities and atelectasis, with small bilateral  pleural effusions. Cardiomegaly with small pericardial effusion. Severe  atherosclerosis.  6.   Speckled appearance of the spleen, with multiple small hypodensities.  7.   Multiple other incidental and nonacute findings, as described above.    Electronically Signed by: NITA SANCHEZ M.D.   Signed on: 1/5/2022 3:53 PM                Narrative:    CT OF THE ABDOMEN AND PELVIS    INDICATION: 89 years old Female with abdominal pain    COMPARISON STUDIES: None are available.    TECHNIQUE:  Multidetector helical CT of the abdomen and pelvis was  performed. Approximately 89 cc of Omnipaque 300 was administered  intravenously.  Images are displayed in the axial, coronal and sagittal  planes. Adjustment of the mA and/or kV was done according to the patient's  size.    FINDINGS:    Lower thorax: Bibasilar groundglass opacities and atelectasis are present,  with small bilateral pleural effusions. The heart is enlarged, with a small  pericardial effusion.     Liver: The liver demonstrates normal size, contour and attenuation. There  is no focal, enhancing hepatic lesion.     Gallbladder and biliary system: The gallbladder is possibly visualized,  though this could also be a dilated bile duct. Severe intrahepatic biliary  ductal dilation is present. The common bile duct measures 2 cm in diameter,  with abrupt tapering at the ampulla.    Pancreas: There is fatty infiltration of the pancreas. Scattered hypodense  pancreatic lesions are  present.    Spleen: There is a speckled appearance of the spleen, with multiple small  hypodensities.    Adrenal glands: The adrenal glands are normal in size, contour and  attenuation.  There is no focal abnormality.    Kidneys: Normal and symmetric enhancement of the kidneys. Both kidneys  appear atrophic, with cortical scarring. There is no hydronephrosis or  obstructive uropathy. There is no focal renal lesion. Multiple small  bilateral cortical hypodensities are likely cysts.    Gastrointestinal tract: There is fluid distention of the distal esophagus.  The stomach is mildly distended with air-fluid levels, otherwise  unremarkable.     A large ventral abdominal wall hernia is present, containing the majority  of the large intestine and small intestine. The herniated small bowel loops  are dilated with wall thickening and air-fluid levels, though there are  some decompressed herniated small bowel loops as well. The transitional  point appears to be within the hernia sac, subjacent to the peritoneal  reflection and near the umbilicus (2:49-61).     There is large formed stool in the rectal vault, with rectal wall  thickening and perirectal inflammation. Mild colonic diverticulosis is  present, and there is mild to moderate formed stool throughout the  remainder of the large intestine. The appendix is not definitely  identified.    Peritoneum/mesentery/lymph nodes: Diffuse mesenteric edema and mild free  fluid are present. There is no pneumatosis. No pneumoperitoneum or  organized fluid collection. There are no pathologically enlarged lymph  nodes.    Vasculature: Normal caliber and enhancement. There are extensive  atherosclerotic calcifications.    Bladder/pelvic structures: There is wall thickening of a partially  distended urinary bladder. The uterus is surgically absent. There are no  adnexal masses.    Soft tissues: The large ventral abdominal wall hernia has a wall defect  measuring approximately 11.8 cm.  There is diffuse body wall edema, with  dermal thickening and soft tissue stranding overlying the hernia sac.    Osseous structures: Moderate degenerative changes are present, without  acute fracture, dislocation, or suspicious osseous lesion.                                  ED Medication Orders (From admission, onward)    Ordered Start     Status Ordering Provider    01/05/22 1423 01/05/22 1430  aspirin tablet 325 mg  ONCE         Last MAR action: Given ISAIAS STERN LISA    01/05/22 1300 01/05/22 1315  ondansetron (ZOFRAN) injection 4 mg  ONCE         Last MAR action: Given YarsanismTYREL STARRL    01/05/22 1300 01/05/22 1315  famotidine (PEPCID) injection 20 mg  ONCE         Last MAR action: Given Yarsanism, ALIMUL    01/05/22 1300 01/05/22 1315  sodium chloride (NORMAL SALINE) 0.9 % bolus 1,000 mL  ONCE         Last MAR action: Completed RUIZ SANCHEZ        MDM    89-year-old female with history as above who presents for evaluation of abdominal pain.  On exam, patient is nontoxic-appearing, afebrile, saturating well on room air, mild hypertensive, not tachycardic.  Abdominal exam is remarkable for mild tenderness to palpation in the left upper and left lower quadrant without peritoneal signs.  Differential diagnosis includes ileus, diverticulitis, gastritis, obstruction, unlikely bowel ischemia, aortic pathology, peritonitis given exam and history.  Will obtain abdominal labs, CT of the abdomen pelvis.  Given Pepcid, Zofran, fluids.  Also obtain cardiac work-up given upper abdominal pain and elderly female.  Disposition pending work-up and patient's ability to tolerate p.o.    ED Course       ED Course as of 01/05/22 1749   Wed Jan 05, 2022   1427 CBC without leukocytosis.  Chemistry with normal renal function, elevated BUN/creatinine ratio, receiving 1 L of IV fluids, secondary to decreased p.o. intake.  Lactic acid unremarkable.  Magnesium within normal limits.  Lipase negative.  Troponin elevated to 351, no chest pain,  will aspirin load. [AI]   1609 Patient CT scan shows SBO with transition point and large ventral hernia.  On repeat examination, patient's hernia is reducible.  Also shows stercoral colitis.  Made n.p.o., surgery consulted.  Patient CT scan is also remarkable for severe biliary dilatation and indeterminate pancreatic lesion, will consult GI and admit.  [AI]   1749 Urinalysis is suggestive of infection, will cover with Rocephin. [AI]      ED Course User Index  [AI] Shannan Villanueva MD       Procedures    Clinical Impression     ED Diagnosis   1. Small bowel obstruction (CMS/HCC)         Disposition        Admit 1/5/2022  4:28 PM  Telemetry Bed?: Yes  Admitting Physician: AMMY BARRAZA [[id number]]  Is this a telephone or verbal order?: This is a telephone order from the admitting physician  Transferring Patient to? Only adjust for [facility] [facility] [[id number]]      Case discussed with and pt evaluated by attending Dr. López. Refer to attending note for further documentation.    Disclaimer: Patient chart was completed partially or completely using speech recognition software, minor errors in transcription may be present.      Shannan Villanueva MD  Emergency Medicine PGY-2     Shannan Villanueva MD  Resident  01/05/22 3086     [Negative] : Heme/Lymph

## 2024-10-02 NOTE — HISTORY OF PRESENT ILLNESS
[Neck] : neck [Upper back] : upper back [0] : 0 [Radiating] : radiating [Sharp] : sharp [Shooting] : shooting [Tightness] : tightness [Constant] : constant [Household chores] : household chores [Leisure] : leisure [Sleep] : sleep [Nothing helps with pain getting better] : Nothing helps with pain getting better [Sitting] : sitting [Standing] : standing [Lying in bed] : lying in bed [10] : 10 [5] : 5 [] : no [FreeTextEntry1] : L shoulder  [de-identified] : x-ray and MRI [TWNoteComboBox1] : 90%

## 2024-10-16 ENCOUNTER — APPOINTMENT (OUTPATIENT)
Dept: PAIN MANAGEMENT | Facility: CLINIC | Age: 64
End: 2024-10-16
Payer: COMMERCIAL

## 2024-10-16 DIAGNOSIS — M51.369: ICD-10-CM

## 2024-10-16 PROCEDURE — 62323 NJX INTERLAMINAR LMBR/SAC: CPT

## 2024-10-21 ENCOUNTER — APPOINTMENT (OUTPATIENT)
Dept: ORTHOPEDIC SURGERY | Facility: CLINIC | Age: 64
End: 2024-10-21

## 2024-10-30 ENCOUNTER — APPOINTMENT (OUTPATIENT)
Dept: PAIN MANAGEMENT | Facility: CLINIC | Age: 64
End: 2024-10-30
Payer: COMMERCIAL

## 2024-10-30 VITALS — WEIGHT: 235 LBS | HEIGHT: 66 IN | BODY MASS INDEX: 37.77 KG/M2

## 2024-10-30 DIAGNOSIS — M54.12 RADICULOPATHY, CERVICAL REGION: ICD-10-CM

## 2024-10-30 DIAGNOSIS — M51.369: ICD-10-CM

## 2024-10-30 DIAGNOSIS — M65.332 TRIGGER FINGER, LEFT MIDDLE FINGER: ICD-10-CM

## 2024-10-30 DIAGNOSIS — M48.062 SPINAL STENOSIS, LUMBAR REGION WITH NEUROGENIC CLAUDICATION: ICD-10-CM

## 2024-10-30 PROCEDURE — 99214 OFFICE O/P EST MOD 30 MIN: CPT

## 2024-11-04 ENCOUNTER — APPOINTMENT (OUTPATIENT)
Dept: ORTHOPEDIC SURGERY | Facility: CLINIC | Age: 64
End: 2024-11-04

## 2024-11-12 ENCOUNTER — APPOINTMENT (OUTPATIENT)
Dept: ORTHOPEDIC SURGERY | Facility: CLINIC | Age: 64
End: 2024-11-12

## 2024-11-18 ENCOUNTER — APPOINTMENT (OUTPATIENT)
Dept: ORTHOPEDIC SURGERY | Facility: CLINIC | Age: 64
End: 2024-11-18
Payer: COMMERCIAL

## 2024-11-18 DIAGNOSIS — M19.049 PRIMARY OSTEOARTHRITIS, UNSPECIFIED HAND: ICD-10-CM

## 2024-11-18 PROCEDURE — 73130 X-RAY EXAM OF HAND: CPT | Mod: 50

## 2024-11-18 PROCEDURE — 99203 OFFICE O/P NEW LOW 30 MIN: CPT

## 2024-11-20 ENCOUNTER — APPOINTMENT (OUTPATIENT)
Dept: PAIN MANAGEMENT | Facility: CLINIC | Age: 64
End: 2024-11-20
Payer: COMMERCIAL

## 2024-11-20 DIAGNOSIS — M48.062 SPINAL STENOSIS, LUMBAR REGION WITH NEUROGENIC CLAUDICATION: ICD-10-CM

## 2024-11-20 DIAGNOSIS — M51.369: ICD-10-CM

## 2024-11-20 PROCEDURE — 64483 NJX AA&/STRD TFRM EPI L/S 1: CPT | Mod: 50

## 2024-12-04 ENCOUNTER — APPOINTMENT (OUTPATIENT)
Dept: PAIN MANAGEMENT | Facility: CLINIC | Age: 64
End: 2024-12-04
Payer: COMMERCIAL

## 2024-12-04 VITALS — HEIGHT: 66 IN | WEIGHT: 236 LBS | BODY MASS INDEX: 37.93 KG/M2

## 2024-12-04 DIAGNOSIS — M48.062 SPINAL STENOSIS, LUMBAR REGION WITH NEUROGENIC CLAUDICATION: ICD-10-CM

## 2024-12-04 PROCEDURE — 99214 OFFICE O/P EST MOD 30 MIN: CPT

## 2024-12-09 ENCOUNTER — APPOINTMENT (OUTPATIENT)
Dept: ORTHOPEDIC SURGERY | Facility: CLINIC | Age: 64
End: 2024-12-09
Payer: COMMERCIAL

## 2024-12-09 VITALS — BODY MASS INDEX: 37.93 KG/M2 | WEIGHT: 236 LBS | HEIGHT: 66 IN

## 2024-12-09 DIAGNOSIS — M51.369: ICD-10-CM

## 2024-12-09 DIAGNOSIS — M54.12 RADICULOPATHY, CERVICAL REGION: ICD-10-CM

## 2024-12-09 PROCEDURE — 99213 OFFICE O/P EST LOW 20 MIN: CPT

## 2024-12-11 ENCOUNTER — APPOINTMENT (OUTPATIENT)
Dept: CT IMAGING | Facility: CLINIC | Age: 64
End: 2024-12-11
Payer: COMMERCIAL

## 2024-12-11 PROCEDURE — 72125 CT NECK SPINE W/O DYE: CPT

## 2024-12-12 ENCOUNTER — APPOINTMENT (OUTPATIENT)
Dept: MAMMOGRAPHY | Facility: IMAGING CENTER | Age: 64
End: 2024-12-12
Payer: COMMERCIAL

## 2024-12-12 ENCOUNTER — OUTPATIENT (OUTPATIENT)
Dept: OUTPATIENT SERVICES | Facility: HOSPITAL | Age: 64
LOS: 1 days | End: 2024-12-12
Payer: COMMERCIAL

## 2024-12-12 ENCOUNTER — APPOINTMENT (OUTPATIENT)
Dept: ULTRASOUND IMAGING | Facility: IMAGING CENTER | Age: 64
End: 2024-12-12
Payer: COMMERCIAL

## 2024-12-12 DIAGNOSIS — Z00.8 ENCOUNTER FOR OTHER GENERAL EXAMINATION: ICD-10-CM

## 2024-12-12 PROCEDURE — 77067 SCR MAMMO BI INCL CAD: CPT

## 2024-12-12 PROCEDURE — 76641 ULTRASOUND BREAST COMPLETE: CPT | Mod: 26,50

## 2024-12-12 PROCEDURE — 77063 BREAST TOMOSYNTHESIS BI: CPT | Mod: 26

## 2024-12-12 PROCEDURE — 77067 SCR MAMMO BI INCL CAD: CPT | Mod: 26

## 2024-12-12 PROCEDURE — 76641 ULTRASOUND BREAST COMPLETE: CPT

## 2024-12-12 PROCEDURE — 77063 BREAST TOMOSYNTHESIS BI: CPT

## 2025-01-03 ENCOUNTER — APPOINTMENT (OUTPATIENT)
Dept: ORTHOPEDIC SURGERY | Facility: CLINIC | Age: 65
End: 2025-01-03
Payer: COMMERCIAL

## 2025-01-03 VITALS — HEIGHT: 66 IN | BODY MASS INDEX: 37.93 KG/M2 | WEIGHT: 236 LBS

## 2025-01-03 DIAGNOSIS — M48.062 SPINAL STENOSIS, LUMBAR REGION WITH NEUROGENIC CLAUDICATION: ICD-10-CM

## 2025-01-03 DIAGNOSIS — G95.20 UNSPECIFIED CORD COMPRESSION: ICD-10-CM

## 2025-01-03 DIAGNOSIS — M54.12 RADICULOPATHY, CERVICAL REGION: ICD-10-CM

## 2025-01-03 PROCEDURE — 99215 OFFICE O/P EST HI 40 MIN: CPT

## 2025-03-20 ENCOUNTER — OUTPATIENT (OUTPATIENT)
Dept: OUTPATIENT SERVICES | Facility: HOSPITAL | Age: 65
LOS: 1 days | End: 2025-03-20

## 2025-03-20 VITALS
HEART RATE: 97 BPM | OXYGEN SATURATION: 100 % | DIASTOLIC BLOOD PRESSURE: 87 MMHG | HEIGHT: 67.5 IN | TEMPERATURE: 99 F | WEIGHT: 226.64 LBS | RESPIRATION RATE: 16 BRPM | SYSTOLIC BLOOD PRESSURE: 135 MMHG

## 2025-03-20 DIAGNOSIS — Z86.69 PERSONAL HISTORY OF OTHER DISEASES OF THE NERVOUS SYSTEM AND SENSE ORGANS: ICD-10-CM

## 2025-03-20 DIAGNOSIS — G95.20 UNSPECIFIED CORD COMPRESSION: ICD-10-CM

## 2025-03-20 DIAGNOSIS — Z98.890 OTHER SPECIFIED POSTPROCEDURAL STATES: Chronic | ICD-10-CM

## 2025-03-20 DIAGNOSIS — Z98.51 TUBAL LIGATION STATUS: Chronic | ICD-10-CM

## 2025-03-20 DIAGNOSIS — Z91.89 OTHER SPECIFIED PERSONAL RISK FACTORS, NOT ELSEWHERE CLASSIFIED: ICD-10-CM

## 2025-03-20 DIAGNOSIS — E11.9 TYPE 2 DIABETES MELLITUS WITHOUT COMPLICATIONS: ICD-10-CM

## 2025-03-20 LAB
A1C WITH ESTIMATED AVERAGE GLUCOSE RESULT: 6.2 % — HIGH (ref 4–5.6)
ANION GAP SERPL CALC-SCNC: 11 MMOL/L — SIGNIFICANT CHANGE UP (ref 7–14)
BASOPHILS # BLD AUTO: 0.05 K/UL — SIGNIFICANT CHANGE UP (ref 0–0.2)
BLD GP AB SCN SERPL QL: NEGATIVE — SIGNIFICANT CHANGE UP
BUN SERPL-MCNC: 10 MG/DL — SIGNIFICANT CHANGE UP (ref 7–23)
CALCIUM SERPL-MCNC: 9.4 MG/DL — SIGNIFICANT CHANGE UP (ref 8.4–10.5)
CHLORIDE SERPL-SCNC: 106 MMOL/L — SIGNIFICANT CHANGE UP (ref 98–107)
CO2 SERPL-SCNC: 26 MMOL/L — SIGNIFICANT CHANGE UP (ref 22–31)
CREAT SERPL-MCNC: 0.77 MG/DL — SIGNIFICANT CHANGE UP (ref 0.5–1.3)
EGFR: 87 ML/MIN/1.73M2 — SIGNIFICANT CHANGE UP
EGFR: 87 ML/MIN/1.73M2 — SIGNIFICANT CHANGE UP
EOSINOPHIL # BLD AUTO: 0.12 K/UL — SIGNIFICANT CHANGE UP (ref 0–0.5)
EOSINOPHIL NFR BLD AUTO: 1.7 % — SIGNIFICANT CHANGE UP (ref 0–6)
ESTIMATED AVERAGE GLUCOSE: 131 — SIGNIFICANT CHANGE UP
GLUCOSE SERPL-MCNC: 108 MG/DL — HIGH (ref 70–99)
HCT VFR BLD CALC: 39.2 % — SIGNIFICANT CHANGE UP (ref 34.5–45)
HGB BLD-MCNC: 12.8 G/DL — SIGNIFICANT CHANGE UP (ref 11.5–15.5)
LYMPHOCYTES # BLD AUTO: 2.47 K/UL — SIGNIFICANT CHANGE UP (ref 1–3.3)
LYMPHOCYTES # BLD AUTO: 34.2 % — SIGNIFICANT CHANGE UP (ref 13–44)
MCHC RBC-ENTMCNC: 28.4 PG — SIGNIFICANT CHANGE UP (ref 27–34)
MCHC RBC-ENTMCNC: 32.7 G/DL — SIGNIFICANT CHANGE UP (ref 32–36)
MONOCYTES # BLD AUTO: 0.54 K/UL — SIGNIFICANT CHANGE UP (ref 0–0.9)
MONOCYTES NFR BLD AUTO: 7.5 % — SIGNIFICANT CHANGE UP (ref 2–14)
NEUTROPHILS # BLD AUTO: 4.02 K/UL — SIGNIFICANT CHANGE UP (ref 1.8–7.4)
NEUTROPHILS NFR BLD AUTO: 55.6 % — SIGNIFICANT CHANGE UP (ref 43–77)
PLATELET # BLD AUTO: 306 K/UL — SIGNIFICANT CHANGE UP (ref 150–400)
POTASSIUM SERPL-MCNC: 4 MMOL/L — SIGNIFICANT CHANGE UP (ref 3.5–5.3)
POTASSIUM SERPL-SCNC: 4 MMOL/L — SIGNIFICANT CHANGE UP (ref 3.5–5.3)
RBC # FLD: 14.3 % — SIGNIFICANT CHANGE UP (ref 10.3–14.5)
RH IG SCN BLD-IMP: POSITIVE — SIGNIFICANT CHANGE UP
SODIUM SERPL-SCNC: 143 MMOL/L — SIGNIFICANT CHANGE UP (ref 135–145)
WBC # BLD: 7.22 K/UL — SIGNIFICANT CHANGE UP (ref 3.8–10.5)
WBC # FLD AUTO: 7.22 K/UL — SIGNIFICANT CHANGE UP (ref 3.8–10.5)

## 2025-03-20 NOTE — H&P PST ADULT - PROBLEM SELECTOR PLAN 2
a1c pending, will order FS for the DOS.  Pt last dose of Ozempic is 3/8/25   Pt also advised not to take any diabetic meds on the DOS.

## 2025-03-20 NOTE — H&P PST ADULT - ASSESSMENT
Pt h/o cord compression and neck and low back pain  presents for preop eval to have C2-T1 posterior decompression and fusion on 3/27/25.     Pt with h/o cord compression and neck and low back pain  presents for preop eval to have C2-T1 posterior decompression and fusion on 3/27/25.

## 2025-03-20 NOTE — H&P PST ADULT - NSANTHOSAYNRD_GEN_A_CORE
No. CRISTELA screening performed.  STOP BANG Legend: 0-2 = LOW Risk; 3-4 = INTERMEDIATE Risk; 5-8 = HIGH Risk

## 2025-03-20 NOTE — H&P PST ADULT - NSICDXPASTMEDICALHX_GEN_ALL_CORE_FT
PAST MEDICAL HISTORY:  Benign breast lumps     DM (diabetes mellitus)     Gall stones     History of umbilical hernia     HTN (hypertension)     Hyperlipidemia      PAST MEDICAL HISTORY:  Benign breast lumps     DM (diabetes mellitus)     Gall stones     H/O spinal cord compression     History of umbilical hernia     HTN (hypertension)     Hyperlipidemia

## 2025-03-20 NOTE — H&P PST ADULT - PROBLEM SELECTOR PLAN 1
Pt h/o cord compression and neck and low back pain  presents for preop eval to have C2-T1 posterior decompression and fusion on 3/27/25.   labs pending,  Pre-op instructions given, patient verbalized understanding.   Pepcid given to patient for GI prophylaxis.   Chlorhexidine wash provided, verbal and written instructions with teach back, and the patient verbalized understanding with return demonstration. Pt with h/o cord compression and neck and low back pain  presents for preop eval to have C2-T1 posterior decompression and fusion on 3/27/25.   labs pending,  Pre-op instructions given, patient verbalized understanding.   Pepcid given to patient for GI prophylaxis.   Chlorhexidine wash  provided for 3 consecutive days.  verbal and written instructions with teach back, and the patient verbalized understanding with return demonstration.

## 2025-03-20 NOTE — H&P PST ADULT - NSICDXPASTSURGICALHX_GEN_ALL_CORE_FT
PAST SURGICAL HISTORY:  H/O tubal ligation     History of cholecystectomy     History of lumpectomy of left breast     History of umbilical hernia repair

## 2025-03-20 NOTE — H&P PST ADULT - HISTORY OF PRESENT ILLNESS
64-year-old LHD female presenting with neck, upper and lower back pain for many yrs. that radiates to scapular and LUE. Patient reports having pain into left hand. Denies change in dexterity or fine motor skills. Patient with chronic low back pain and BLE radicular pain that has been managed with PT, LESI and muscle relaxants. Patient reports symptoms she continues to have persistent low back pain that continues. Patient does reports buckling at times when walking long distances. Denies radicular pain, n/t. Denies changes in b/b function.  Pt presents for preop eval to have C2-T1 posterior decompression and fusion on 3/27/25.   64-year-old female presenting with neck, upper and lower back pain for many yrs. that radiates to scapular and LUE. Patient reports having pain into left hand. Denies change in dexterity or fine motor skills. Patient with chronic low back pain and BLE radicular pain that has been managed with PT, LESI and muscle relaxants. Patient reports symptoms she continues to have persistent low back pain that continues. Patient does reports buckling at times when walking long distances. Denies radicular pain, n/t. Denies changes in b/b function.  Pt presents for preop eval to have C2-T1 posterior decompression and fusion on 3/27/25.

## 2025-03-20 NOTE — H&P PST ADULT - ATTENDING COMMENTS
Patient seen and examined in preoperative holding area. Neck pain and  Left upper extremity radiculopathy with hand dysfunction.  States having gait disturbance as well that has been worsening.  Has tandem stenosis in lumbar spine as well with claudication.  Failed conservative treatment including PT, medications and PAMELA.  MRI with multilevel disc with severe central and foraminal stenosis no cord signal change. Ct showed large calcified disc causing cord compression at C3-4.  Foraminal stenosis worst at C5-6 and C-7.  Given calcification of disc at C3-4 best option is posterior cervical. Discussed C2-T1 vs C3-7 Fusion with patient. Main issue is her arm symptoms so favor C3-C7 Posterior cervical decompression and fusion with Lami from C3-6.   Risks and benefits of surgery including but not limited to bleeding, infection, damage to surrounding structures, paralysis, persistent neck pain, persistent arm symptoms, hardware failure, hardware misplacement, pseudoarthrosis, C5 Palsy, hoarseness, dysphagia, hematoma, need for revision procedure were discussed with patient at length in the preoperative holding area as well as the office. All questions were answered with verbalized understanding. Informed consent was obtained.  Will proceed with planned procedure.

## 2025-03-21 LAB
MRSA PCR RESULT.: SIGNIFICANT CHANGE UP
S AUREUS DNA NOSE QL NAA+PROBE: SIGNIFICANT CHANGE UP

## 2025-03-26 NOTE — ASU PATIENT PROFILE, ADULT - FALL HARM RISK - UNIVERSAL INTERVENTIONS
Bed in lowest position, wheels locked, appropriate side rails in place/Call bell, personal items and telephone in reach/Instruct patient to call for assistance before getting out of bed or chair/Non-slip footwear when patient is out of bed/Worth to call system/Physically safe environment - no spills, clutter or unnecessary equipment/Purposeful Proactive Rounding/Room/bathroom lighting operational, light cord in reach

## 2025-03-26 NOTE — ASU PATIENT PROFILE, ADULT - NSICDXPASTMEDICALHX_GEN_ALL_CORE_FT
PAST MEDICAL HISTORY:  Benign breast lumps     DM (diabetes mellitus)     Gall stones     H/O spinal cord compression     History of umbilical hernia     HTN (hypertension)     Hyperlipidemia

## 2025-03-27 ENCOUNTER — INPATIENT (INPATIENT)
Facility: HOSPITAL | Age: 65
LOS: 10 days | Discharge: NURSING, REHAB OR LONG-TERM CARE | End: 2025-04-07
Attending: STUDENT IN AN ORGANIZED HEALTH CARE EDUCATION/TRAINING PROGRAM | Admitting: STUDENT IN AN ORGANIZED HEALTH CARE EDUCATION/TRAINING PROGRAM
Payer: COMMERCIAL

## 2025-03-27 ENCOUNTER — APPOINTMENT (OUTPATIENT)
Dept: ORTHOPEDIC SURGERY | Facility: HOSPITAL | Age: 65
End: 2025-03-27

## 2025-03-27 ENCOUNTER — TRANSCRIPTION ENCOUNTER (OUTPATIENT)
Age: 65
End: 2025-03-27

## 2025-03-27 VITALS
SYSTOLIC BLOOD PRESSURE: 138 MMHG | HEART RATE: 100 BPM | HEIGHT: 67 IN | TEMPERATURE: 98 F | OXYGEN SATURATION: 100 % | DIASTOLIC BLOOD PRESSURE: 79 MMHG | WEIGHT: 231.93 LBS | RESPIRATION RATE: 20 BRPM

## 2025-03-27 DIAGNOSIS — Z98.890 OTHER SPECIFIED POSTPROCEDURAL STATES: Chronic | ICD-10-CM

## 2025-03-27 DIAGNOSIS — Z98.51 TUBAL LIGATION STATUS: Chronic | ICD-10-CM

## 2025-03-27 DIAGNOSIS — G95.20 UNSPECIFIED CORD COMPRESSION: ICD-10-CM

## 2025-03-27 LAB
ANION GAP SERPL CALC-SCNC: 10 MMOL/L — SIGNIFICANT CHANGE UP (ref 7–14)
BUN SERPL-MCNC: 10 MG/DL — SIGNIFICANT CHANGE UP (ref 7–23)
CALCIUM SERPL-MCNC: 8.6 MG/DL — SIGNIFICANT CHANGE UP (ref 8.4–10.5)
CHLORIDE SERPL-SCNC: 105 MMOL/L — SIGNIFICANT CHANGE UP (ref 98–107)
CO2 SERPL-SCNC: 22 MMOL/L — SIGNIFICANT CHANGE UP (ref 22–31)
CREAT SERPL-MCNC: 0.58 MG/DL — SIGNIFICANT CHANGE UP (ref 0.5–1.3)
EGFR: 102 ML/MIN/1.73M2 — SIGNIFICANT CHANGE UP
EGFR: 102 ML/MIN/1.73M2 — SIGNIFICANT CHANGE UP
GAS PNL BLDA: SIGNIFICANT CHANGE UP
GLUCOSE BLDC GLUCOMTR-MCNC: 118 MG/DL — HIGH (ref 70–99)
GLUCOSE BLDC GLUCOMTR-MCNC: 119 MG/DL — HIGH (ref 70–99)
GLUCOSE BLDC GLUCOMTR-MCNC: 120 MG/DL — HIGH (ref 70–99)
GLUCOSE BLDC GLUCOMTR-MCNC: 126 MG/DL — HIGH (ref 70–99)
GLUCOSE BLDC GLUCOMTR-MCNC: 154 MG/DL — HIGH (ref 70–99)
GLUCOSE BLDC GLUCOMTR-MCNC: 160 MG/DL — HIGH (ref 70–99)
GLUCOSE SERPL-MCNC: 168 MG/DL — HIGH (ref 70–99)
HCT VFR BLD CALC: 35.1 % — SIGNIFICANT CHANGE UP (ref 34.5–45)
HGB BLD-MCNC: 11.4 G/DL — LOW (ref 11.5–15.5)
MCHC RBC-ENTMCNC: 27.9 PG — SIGNIFICANT CHANGE UP (ref 27–34)
MCHC RBC-ENTMCNC: 32.5 G/DL — SIGNIFICANT CHANGE UP (ref 32–36)
MCV RBC AUTO: 86 FL — SIGNIFICANT CHANGE UP (ref 80–100)
NRBC # BLD AUTO: 0 K/UL — SIGNIFICANT CHANGE UP (ref 0–0)
NRBC # FLD: 0 K/UL — SIGNIFICANT CHANGE UP (ref 0–0)
NRBC BLD AUTO-RTO: 0 /100 WBCS — SIGNIFICANT CHANGE UP (ref 0–0)
PLATELET # BLD AUTO: 246 K/UL — SIGNIFICANT CHANGE UP (ref 150–400)
POTASSIUM SERPL-MCNC: 3.9 MMOL/L — SIGNIFICANT CHANGE UP (ref 3.5–5.3)
POTASSIUM SERPL-SCNC: 3.9 MMOL/L — SIGNIFICANT CHANGE UP (ref 3.5–5.3)
RBC # BLD: 4.08 M/UL — SIGNIFICANT CHANGE UP (ref 3.8–5.2)
RBC # FLD: 14 % — SIGNIFICANT CHANGE UP (ref 10.3–14.5)
SODIUM SERPL-SCNC: 137 MMOL/L — SIGNIFICANT CHANGE UP (ref 135–145)
WBC # BLD: 7.96 K/UL — SIGNIFICANT CHANGE UP (ref 3.8–10.5)
WBC # FLD AUTO: 7.96 K/UL — SIGNIFICANT CHANGE UP (ref 3.8–10.5)

## 2025-03-27 PROCEDURE — 63048 LAM FACETEC &FORAMOT EA ADDL: CPT

## 2025-03-27 PROCEDURE — 63045 LAM FACETEC & FORAMOT CRV: CPT

## 2025-03-27 PROCEDURE — 20936 SP BONE AGRFT LOCAL ADD-ON: CPT

## 2025-03-27 PROCEDURE — 22614 ARTHRD PST TQ 1NTRSPC EA ADD: CPT

## 2025-03-27 PROCEDURE — 22600 ARTHRD PST TQ 1NTRSPC CRV: CPT

## 2025-03-27 PROCEDURE — 22842 INSERT SPINE FIXATION DEVICE: CPT

## 2025-03-27 DEVICE — SCREW MA 3.5X12MM: Type: IMPLANTABLE DEVICE | Status: FUNCTIONAL

## 2025-03-27 DEVICE — IMPLANTABLE DEVICE: Type: IMPLANTABLE DEVICE | Status: FUNCTIONAL

## 2025-03-27 DEVICE — SURGIFLO MATRIX WITH THROMBIN KIT: Type: IMPLANTABLE DEVICE | Status: FUNCTIONAL

## 2025-03-27 DEVICE — MAYFIELD SKULL PIN ADULT PLASTIC: Type: IMPLANTABLE DEVICE | Status: FUNCTIONAL

## 2025-03-27 DEVICE — BONE WAX 2.5GM: Type: IMPLANTABLE DEVICE | Status: FUNCTIONAL

## 2025-03-27 DEVICE — SURGIFOAM 8 X 12.25CM X 2MM: Type: IMPLANTABLE DEVICE | Status: FUNCTIONAL

## 2025-03-27 DEVICE — SET SCREW VP 2 TULIP AND LAMINAR HOOK: Type: IMPLANTABLE DEVICE | Status: FUNCTIONAL

## 2025-03-27 DEVICE — SCREW MA 3.5X14MM: Type: IMPLANTABLE DEVICE | Status: FUNCTIONAL

## 2025-03-27 RX ORDER — ATORVASTATIN CALCIUM 80 MG/1
20 TABLET, FILM COATED ORAL AT BEDTIME
Refills: 0 | Status: DISCONTINUED | OUTPATIENT
Start: 2025-03-27 | End: 2025-04-07

## 2025-03-27 RX ORDER — AMLODIPINE BESYLATE 10 MG/1
5 TABLET ORAL DAILY
Refills: 0 | Status: DISCONTINUED | OUTPATIENT
Start: 2025-03-27 | End: 2025-04-07

## 2025-03-27 RX ORDER — DEXTROSE 50 % IN WATER 50 %
15 SYRINGE (ML) INTRAVENOUS ONCE
Refills: 0 | Status: DISCONTINUED | OUTPATIENT
Start: 2025-03-27 | End: 2025-04-07

## 2025-03-27 RX ORDER — ONDANSETRON HCL/PF 4 MG/2 ML
4 VIAL (ML) INJECTION EVERY 6 HOURS
Refills: 0 | Status: DISCONTINUED | OUTPATIENT
Start: 2025-03-27 | End: 2025-04-07

## 2025-03-27 RX ORDER — SENNA 187 MG
2 TABLET ORAL AT BEDTIME
Refills: 0 | Status: DISCONTINUED | OUTPATIENT
Start: 2025-03-27 | End: 2025-04-07

## 2025-03-27 RX ORDER — CYCLOBENZAPRINE HYDROCHLORIDE 15 MG/1
5 CAPSULE, EXTENDED RELEASE ORAL THREE TIMES A DAY
Refills: 0 | Status: DISCONTINUED | OUTPATIENT
Start: 2025-03-27 | End: 2025-03-28

## 2025-03-27 RX ORDER — LOVASTATIN 20 MG/1
2 TABLET ORAL
Refills: 0 | DISCHARGE

## 2025-03-27 RX ORDER — ACETAMINOPHEN 500 MG/5ML
975 LIQUID (ML) ORAL ONCE
Refills: 0 | Status: DISCONTINUED | OUTPATIENT
Start: 2025-03-27 | End: 2025-03-27

## 2025-03-27 RX ORDER — TRAMADOL HYDROCHLORIDE 50 MG/1
50 TABLET, FILM COATED ORAL ONCE
Refills: 0 | Status: DISCONTINUED | OUTPATIENT
Start: 2025-03-27 | End: 2025-03-27

## 2025-03-27 RX ORDER — INSULIN LISPRO 100 U/ML
INJECTION, SOLUTION INTRAVENOUS; SUBCUTANEOUS AT BEDTIME
Refills: 0 | Status: DISCONTINUED | OUTPATIENT
Start: 2025-03-27 | End: 2025-04-07

## 2025-03-27 RX ORDER — HYDROMORPHONE/SOD CHLOR,ISO/PF 2 MG/10 ML
0.25 SYRINGE (ML) INJECTION
Refills: 0 | Status: DISCONTINUED | OUTPATIENT
Start: 2025-03-27 | End: 2025-03-27

## 2025-03-27 RX ORDER — ACETAMINOPHEN 500 MG/5ML
1000 LIQUID (ML) ORAL ONCE
Refills: 0 | Status: COMPLETED | OUTPATIENT
Start: 2025-03-27 | End: 2025-03-27

## 2025-03-27 RX ORDER — AMLODIPINE BESYLATE 10 MG/1
1 TABLET ORAL
Refills: 0 | DISCHARGE

## 2025-03-27 RX ORDER — OXYCODONE HYDROCHLORIDE 30 MG/1
10 TABLET ORAL EVERY 4 HOURS
Refills: 0 | Status: DISCONTINUED | OUTPATIENT
Start: 2025-03-27 | End: 2025-03-27

## 2025-03-27 RX ORDER — NALOXONE HYDROCHLORIDE 0.4 MG/ML
0.1 INJECTION, SOLUTION INTRAMUSCULAR; INTRAVENOUS; SUBCUTANEOUS
Refills: 0 | Status: DISCONTINUED | OUTPATIENT
Start: 2025-03-27 | End: 2025-04-07

## 2025-03-27 RX ORDER — ONDANSETRON HCL/PF 4 MG/2 ML
4 VIAL (ML) INJECTION ONCE
Refills: 0 | Status: DISCONTINUED | OUTPATIENT
Start: 2025-03-27 | End: 2025-03-27

## 2025-03-27 RX ORDER — GLUCAGON 3 MG/1
1 POWDER NASAL ONCE
Refills: 0 | Status: DISCONTINUED | OUTPATIENT
Start: 2025-03-27 | End: 2025-04-07

## 2025-03-27 RX ORDER — ORAL SEMAGLUTIDE 14 MG/1
0.25 TABLET ORAL
Refills: 0 | DISCHARGE

## 2025-03-27 RX ORDER — DEXTROSE 50 % IN WATER 50 %
12.5 SYRINGE (ML) INTRAVENOUS ONCE
Refills: 0 | Status: DISCONTINUED | OUTPATIENT
Start: 2025-03-27 | End: 2025-04-07

## 2025-03-27 RX ORDER — ACETAMINOPHEN 500 MG/5ML
975 LIQUID (ML) ORAL EVERY 8 HOURS
Refills: 0 | Status: DISCONTINUED | OUTPATIENT
Start: 2025-03-27 | End: 2025-03-27

## 2025-03-27 RX ORDER — HYDROMORPHONE/SOD CHLOR,ISO/PF 2 MG/10 ML
30 SYRINGE (ML) INJECTION
Refills: 0 | Status: DISCONTINUED | OUTPATIENT
Start: 2025-03-27 | End: 2025-03-28

## 2025-03-27 RX ORDER — INSULIN LISPRO 100 U/ML
INJECTION, SOLUTION INTRAVENOUS; SUBCUTANEOUS
Refills: 0 | Status: DISCONTINUED | OUTPATIENT
Start: 2025-03-27 | End: 2025-04-07

## 2025-03-27 RX ORDER — SODIUM CHLORIDE 9 G/1000ML
1000 INJECTION, SOLUTION INTRAVENOUS
Refills: 0 | Status: DISCONTINUED | OUTPATIENT
Start: 2025-03-27 | End: 2025-04-07

## 2025-03-27 RX ORDER — DEXTROSE 50 % IN WATER 50 %
25 SYRINGE (ML) INTRAVENOUS ONCE
Refills: 0 | Status: DISCONTINUED | OUTPATIENT
Start: 2025-03-27 | End: 2025-04-07

## 2025-03-27 RX ORDER — POLYETHYLENE GLYCOL 3350 17 G/17G
17 POWDER, FOR SOLUTION ORAL DAILY
Refills: 0 | Status: DISCONTINUED | OUTPATIENT
Start: 2025-03-27 | End: 2025-04-06

## 2025-03-27 RX ORDER — CEFAZOLIN SODIUM IN 0.9 % NACL 3 G/100 ML
2000 INTRAVENOUS SOLUTION, PIGGYBACK (ML) INTRAVENOUS EVERY 8 HOURS
Refills: 0 | Status: COMPLETED | OUTPATIENT
Start: 2025-03-27 | End: 2025-03-28

## 2025-03-27 RX ORDER — GABAPENTIN 400 MG/1
100 CAPSULE ORAL EVERY 8 HOURS
Refills: 0 | Status: DISCONTINUED | OUTPATIENT
Start: 2025-03-27 | End: 2025-04-03

## 2025-03-27 RX ORDER — SODIUM CHLORIDE 9 G/1000ML
1000 INJECTION, SOLUTION INTRAVENOUS
Refills: 0 | Status: DISCONTINUED | OUTPATIENT
Start: 2025-03-27 | End: 2025-03-27

## 2025-03-27 RX ORDER — HYDROMORPHONE/SOD CHLOR,ISO/PF 2 MG/10 ML
0.5 SYRINGE (ML) INJECTION
Refills: 0 | Status: DISCONTINUED | OUTPATIENT
Start: 2025-03-27 | End: 2025-03-28

## 2025-03-27 RX ORDER — PREGABALIN 75 MG/1
150 CAPSULE ORAL ONCE
Refills: 0 | Status: DISCONTINUED | OUTPATIENT
Start: 2025-03-27 | End: 2025-03-27

## 2025-03-27 RX ORDER — ACETAMINOPHEN 500 MG/5ML
1000 LIQUID (ML) ORAL ONCE
Refills: 0 | Status: COMPLETED | OUTPATIENT
Start: 2025-03-28 | End: 2025-03-28

## 2025-03-27 RX ORDER — HYDROMORPHONE/SOD CHLOR,ISO/PF 2 MG/10 ML
0.5 SYRINGE (ML) INJECTION
Refills: 0 | Status: DISCONTINUED | OUTPATIENT
Start: 2025-03-27 | End: 2025-03-27

## 2025-03-27 RX ADMIN — Medication 100 MILLIGRAM(S): at 16:01

## 2025-03-27 RX ADMIN — INSULIN LISPRO 1: 100 INJECTION, SOLUTION INTRAVENOUS; SUBCUTANEOUS at 18:00

## 2025-03-27 RX ADMIN — Medication 4 MILLIGRAM(S): at 18:37

## 2025-03-27 RX ADMIN — Medication 400 MILLIGRAM(S): at 23:02

## 2025-03-27 RX ADMIN — SODIUM CHLORIDE 30 MILLILITER(S): 9 INJECTION, SOLUTION INTRAVENOUS at 06:24

## 2025-03-27 RX ADMIN — Medication 1000 MILLILITER(S): at 13:21

## 2025-03-27 RX ADMIN — Medication 30 MILLILITER(S): at 18:18

## 2025-03-27 RX ADMIN — Medication 30 MILLILITER(S): at 20:13

## 2025-03-27 RX ADMIN — Medication 30 MILLILITER(S): at 14:37

## 2025-03-27 RX ADMIN — Medication 1000 MILLIGRAM(S): at 23:17

## 2025-03-27 RX ADMIN — Medication 100 MILLILITER(S): at 23:01

## 2025-03-27 NOTE — PROGRESS NOTE ADULT - REASON FOR ADMISSION
SW met with pt and pts wife who present alert and oriented x 4 and engaging.   Pt waiting on TSU bed that can accommodate his HD machine at bedside.   No psychosocial or coping needs identified.  Pt in good spirits.  Sw Remains available.    s/p C3-5 PSF

## 2025-03-27 NOTE — ASU PREOP CHECKLIST - 3.
spoke with Nereida from ortho, patient unable to swallow preop meds, meds cancelled at this time. spoke with Nereida from ortho, patient unable to swallow preop meds, meds cancelled at this time. Dr. blum from anesthesia made aware.

## 2025-03-27 NOTE — ASU PREOP CHECKLIST - SELECT TESTS ORDERED
MRSA/Staph/CBC/CMP/Type and Cross/Type and Screen/POCT Blood Glucose  @ 0600/CBC/CMP/Type and Cross/Type and Screen/POCT Blood Glucose

## 2025-03-27 NOTE — PROGRESS NOTE ADULT - SUBJECTIVE AND OBJECTIVE BOX
Orthopedics Post-Op Note:    Patient was seen and examined at bedside. Denies CP/SOB/Dizziness, N/V/D, HA. Pain is controlled.     Vital Signs Last 24 Hrs  T(C): 36.2 (27 Mar 2025 15:00), Max: 36.8 (27 Mar 2025 05:47)  T(F): 97.2 (27 Mar 2025 15:00), Max: 98.2 (27 Mar 2025 05:47)  HR: 87 (27 Mar 2025 15:00) (86 - 100)  BP: 130/72 (27 Mar 2025 15:00) (112/69 - 138/79)  BP(mean): 88 (27 Mar 2025 15:00) (82 - 95)  RR: 12 (27 Mar 2025 15:00) (12 - 24)  SpO2: 97% (27 Mar 2025 15:00) (93% - 100%)    Parameters below as of 27 Mar 2025 13:45  Patient On (Oxygen Delivery Method): nasal cannula  O2 Flow (L/min): 2    Labs:                        11.4   7.96  )-----------( 246      ( 27 Mar 2025 13:41 )             35.1     03-27    137  |  105  |  10  ----------------------------<  168[H]  3.9   |  22  |  0.58    Ca    8.6      27 Mar 2025 13:41    Physical Exam:  Gen: NAD  NECK: Dressing C/D/I. Drain in place (sewn), C-collar in place    R UE:  Deltoid 5/5  Biceps 5/5  Triceps 5/5  Wrist extension 5/5   strength 5/5  Sensation intact to light touch, radial pulse 2+  Compartments are soft, extremities are warm.     L UE:  Deltoid 5/5  Biceps 5/5  Triceps 5/5  Wrist extension 5/5   strength 5/5  Sensation intact to light touch, radial pulse 2+  Compartments are soft, extremities are warm.

## 2025-03-27 NOTE — PROGRESS NOTE ADULT - ASSESSMENT
A/P: Patient is a 64y y/o Female s/p C3-5 PSF, POD #0   - Aspen collar  - Pain control  - Antibiotics - Ancef postop  - Incentive spirometry  - Venodynes  - F/U AM Labs  - PT/OT/WBAT  - Garcia - monitor output  - Monitor drain output  - Notify orthopedics with any questions

## 2025-03-27 NOTE — PATIENT PROFILE ADULT - FALL HARM RISK - HARM RISK INTERVENTIONS
Assistance with ambulation/Assistance OOB with selected safe patient handling equipment/Communicate Risk of Fall with Harm to all staff/Discuss with provider need for PT consult/Monitor gait and stability/Provide patient with walking aids - walker, cane, crutches/Reinforce activity limits and safety measures with patient and family/Sit up slowly, dangle for a short time, stand at bedside before walking/Tailored Fall Risk Interventions/Use of alarms - bed, chair and/or voice tab/Visual Cue: Yellow wristband and red socks/Bed in lowest position, wheels locked, appropriate side rails in place/Call bell, personal items and telephone in reach/Instruct patient to call for assistance before getting out of bed or chair/Non-slip footwear when patient is out of bed/[place] to call system/Physically safe environment - no spills, clutter or unnecessary equipment/Purposeful Proactive Rounding/Room/bathroom lighting operational, light cord in reach

## 2025-03-28 ENCOUNTER — TRANSCRIPTION ENCOUNTER (OUTPATIENT)
Age: 65
End: 2025-03-28

## 2025-03-28 LAB
ANION GAP SERPL CALC-SCNC: 10 MMOL/L — SIGNIFICANT CHANGE UP (ref 7–14)
BUN SERPL-MCNC: 12 MG/DL — SIGNIFICANT CHANGE UP (ref 7–23)
CALCIUM SERPL-MCNC: 8.9 MG/DL — SIGNIFICANT CHANGE UP (ref 8.4–10.5)
CHLORIDE SERPL-SCNC: 103 MMOL/L — SIGNIFICANT CHANGE UP (ref 98–107)
CO2 SERPL-SCNC: 24 MMOL/L — SIGNIFICANT CHANGE UP (ref 22–31)
CREAT SERPL-MCNC: 0.68 MG/DL — SIGNIFICANT CHANGE UP (ref 0.5–1.3)
EGFR: 98 ML/MIN/1.73M2 — SIGNIFICANT CHANGE UP
EGFR: 98 ML/MIN/1.73M2 — SIGNIFICANT CHANGE UP
GLUCOSE BLDC GLUCOMTR-MCNC: 107 MG/DL — HIGH (ref 70–99)
GLUCOSE BLDC GLUCOMTR-MCNC: 114 MG/DL — HIGH (ref 70–99)
GLUCOSE BLDC GLUCOMTR-MCNC: 120 MG/DL — HIGH (ref 70–99)
GLUCOSE BLDC GLUCOMTR-MCNC: 135 MG/DL — HIGH (ref 70–99)
GLUCOSE SERPL-MCNC: 115 MG/DL — HIGH (ref 70–99)
HCT VFR BLD CALC: 35.9 % — SIGNIFICANT CHANGE UP (ref 34.5–45)
HGB BLD-MCNC: 11.4 G/DL — LOW (ref 11.5–15.5)
MCHC RBC-ENTMCNC: 27.6 PG — SIGNIFICANT CHANGE UP (ref 27–34)
MCHC RBC-ENTMCNC: 31.8 G/DL — LOW (ref 32–36)
MCV RBC AUTO: 86.9 FL — SIGNIFICANT CHANGE UP (ref 80–100)
NRBC # BLD AUTO: 0 K/UL — SIGNIFICANT CHANGE UP (ref 0–0)
NRBC # FLD: 0 K/UL — SIGNIFICANT CHANGE UP (ref 0–0)
NRBC BLD AUTO-RTO: 0 /100 WBCS — SIGNIFICANT CHANGE UP (ref 0–0)
PLATELET # BLD AUTO: 267 K/UL — SIGNIFICANT CHANGE UP (ref 150–400)
POTASSIUM SERPL-MCNC: 5 MMOL/L — SIGNIFICANT CHANGE UP (ref 3.5–5.3)
POTASSIUM SERPL-SCNC: 5 MMOL/L — SIGNIFICANT CHANGE UP (ref 3.5–5.3)
RBC # BLD: 4.13 M/UL — SIGNIFICANT CHANGE UP (ref 3.8–5.2)
RBC # FLD: 14.1 % — SIGNIFICANT CHANGE UP (ref 10.3–14.5)
SODIUM SERPL-SCNC: 137 MMOL/L — SIGNIFICANT CHANGE UP (ref 135–145)
WBC # BLD: 14.53 K/UL — HIGH (ref 3.8–10.5)
WBC # FLD AUTO: 14.53 K/UL — HIGH (ref 3.8–10.5)

## 2025-03-28 RX ORDER — HYDROMORPHONE/SOD CHLOR,ISO/PF 2 MG/10 ML
4 SYRINGE (ML) INJECTION
Refills: 0 | Status: DISCONTINUED | OUTPATIENT
Start: 2025-03-28 | End: 2025-04-04

## 2025-03-28 RX ORDER — CYCLOBENZAPRINE HYDROCHLORIDE 15 MG/1
10 CAPSULE, EXTENDED RELEASE ORAL EVERY 8 HOURS
Refills: 0 | Status: DISCONTINUED | OUTPATIENT
Start: 2025-03-28 | End: 2025-04-03

## 2025-03-28 RX ORDER — HYDROMORPHONE/SOD CHLOR,ISO/PF 2 MG/10 ML
0.25 SYRINGE (ML) INJECTION
Refills: 0 | Status: DISCONTINUED | OUTPATIENT
Start: 2025-03-28 | End: 2025-03-31

## 2025-03-28 RX ORDER — HYDROMORPHONE/SOD CHLOR,ISO/PF 2 MG/10 ML
2 SYRINGE (ML) INJECTION
Refills: 0 | Status: DISCONTINUED | OUTPATIENT
Start: 2025-03-28 | End: 2025-04-04

## 2025-03-28 RX ORDER — ACETAMINOPHEN 500 MG/5ML
1000 LIQUID (ML) ORAL ONCE
Refills: 0 | Status: COMPLETED | OUTPATIENT
Start: 2025-03-28 | End: 2025-03-28

## 2025-03-28 RX ADMIN — Medication 400 MILLIGRAM(S): at 15:15

## 2025-03-28 RX ADMIN — Medication 4 MILLIGRAM(S): at 17:45

## 2025-03-28 RX ADMIN — Medication 4 MILLIGRAM(S): at 17:42

## 2025-03-28 RX ADMIN — CYCLOBENZAPRINE HYDROCHLORIDE 10 MILLIGRAM(S): 15 CAPSULE, EXTENDED RELEASE ORAL at 15:03

## 2025-03-28 RX ADMIN — Medication 4 MILLIGRAM(S): at 13:50

## 2025-03-28 RX ADMIN — ATORVASTATIN CALCIUM 20 MILLIGRAM(S): 80 TABLET, FILM COATED ORAL at 21:14

## 2025-03-28 RX ADMIN — Medication 4 MILLIGRAM(S): at 12:57

## 2025-03-28 RX ADMIN — Medication 4 MILLIGRAM(S): at 18:41

## 2025-03-28 RX ADMIN — Medication 100 MILLIGRAM(S): at 00:19

## 2025-03-28 RX ADMIN — CYCLOBENZAPRINE HYDROCHLORIDE 10 MILLIGRAM(S): 15 CAPSULE, EXTENDED RELEASE ORAL at 21:14

## 2025-03-28 RX ADMIN — Medication 30 MILLILITER(S): at 08:21

## 2025-03-28 RX ADMIN — Medication 1000 MILLIGRAM(S): at 06:21

## 2025-03-28 RX ADMIN — Medication 0.25 MILLIGRAM(S): at 21:39

## 2025-03-28 RX ADMIN — Medication 500 MILLILITER(S): at 06:04

## 2025-03-28 RX ADMIN — Medication 400 MILLIGRAM(S): at 06:06

## 2025-03-28 RX ADMIN — Medication 4 MILLIGRAM(S): at 08:27

## 2025-03-28 RX ADMIN — AMLODIPINE BESYLATE 5 MILLIGRAM(S): 10 TABLET ORAL at 05:59

## 2025-03-28 RX ADMIN — Medication 0.25 MILLIGRAM(S): at 21:54

## 2025-03-28 RX ADMIN — Medication 1000 MILLIGRAM(S): at 16:15

## 2025-03-28 RX ADMIN — Medication 2 TABLET(S): at 21:14

## 2025-03-28 RX ADMIN — Medication 100 MILLILITER(S): at 05:59

## 2025-03-28 RX ADMIN — POLYETHYLENE GLYCOL 3350 17 GRAM(S): 17 POWDER, FOR SOLUTION ORAL at 11:42

## 2025-03-28 NOTE — PHYSICAL THERAPY INITIAL EVALUATION ADULT - ADDITIONAL COMMENTS
Patient reports she lives alone in an apartment complex, with no steps to enter, and elevator access to her apartment on the third floor. Patient reports she ambulated with a single axis cane occasionally outdoors, but usually ambulated with no Assistive device. Patient reports she was independent with all ADLs prior to hospitalization, and denies any history of falls within the past 6 months. Patient was left in the semi-supine position, in NAD, with all lines intact, with bed alarm turned on, and call bell within reach. DESMOND Marin made aware post session.

## 2025-03-28 NOTE — OCCUPATIONAL THERAPY INITIAL EVALUATION ADULT - ASR WT BEARING STATUS EVAL
PROGRESS NOTE  [facility] HOSPITALIST SERVICE    Patient: Mira Lees   YOB: 1936   MR Number: [id number]       Today's Date: 7/6/2020   Date of Admission: 7/4/2020   Attending Physician: Rosas Mcelroy MD     Code Status:  Selective Treatment/DNR    Hospital Day: 3    Primary Care Provider: Myke Frye MD  Consulting Physician(s): Dr. Baum (Gen Surg)  Transfusion:  None   Procedures:  None    Active Issues and Reason for Visit:  Principal Problem:    Diverticulitis  Active Problems:    Severe sepsis (CMS/[facility])    COPD (chronic obstructive pulmonary disease) (CMS/[facility])    CAD (coronary artery disease)    Recurrent major depressive disorder, in partial remission (CMS/[facility])    Prediabetes    Unspecified hypothyroidism    Frequent falls    Bilateral lower extremity edema      Assessment and Plan:  Problem list as noted above.  1. Severe sepsis from acute complicated with abscess: Demonstrated on CT scan, thickening of the distal sigmoid colon.  Fecal occult negative.  On antibiotics with IV ceftriaxone and Flagyl x 3 days then PO, IVF, diet per surgery. IR evaluation for possible drain  2. Acute encephalopathy: Improved. Non-focal and mostly answers appropriately Likely secondary to sepsis/infection. CToH negative, CT C-spine negative.  B12, folate, TSH unremarkable.  Hold all sedating medications (benzos, psychotropics)   3. COPD without exacerbation: continue home inhalers    4. CAD, hyperlipidemia:  No evidence of cardiac ischemia this admission. Troponins negative, EKG nonischemic  5. Regarding the rest of the patient's chronic medical conditions as documented above, they will be monitored during this admission and prior to admission medications will be continued as indicated.     IVF: NS  DVT prophylaxis: SCD, TEDs, holding chemoprophylaxis due to possible operative intervention    Disposition:  TBD    All orders have been entered electronically through [employer].  Patient seen during multidisciplinary rounds  with RN and .  Care plan communicated with patient and staff.  Care plan communicated with family.    Subjective:  Mira Lees is a 84 year old female initially admitted on 7/4/2020  8:38 AM who is being seen in follow up for Diverticulitis.    07/06/20:  Doing well overnight.  She should just woken up this morning in bed and is still sleepy.  She denies any chest pain, shortness of breath.  She is still having some left lower quadrant abdominal pain which seems to be improved.  Patient had a fever yesterday night to 101.5 and has increasing leukocytosis, otherwise hemodynamically stable.  No other complaints    Past Medical/Surgical/Social/Family Histories reviewed with patient as recorded in the admit H&P (dated 7/4/2020).  Meds and Allergies reviewed with patient as recorded in EPIC.    Scheduled meds and infusions:  • amLODIPine  5 mg Oral Daily   • sodium chloride (PF)  2 mL Intracatheter 2 times per day   • aspirin  325 mg Oral Daily   • fluticasone-vilanterol  1 puff Inhalation Daily Resp   • levothyroxine  100 mcg Oral Daily   • pantoprazole  40 mg Intravenous Nightly   • fluticasone  1 spray Each Nare Daily   • cefTRIAXone (ROCEPHIN) IV  2,000 mg Intravenous Q24H   • metroNIDAZOLE (FLAGYL) IVPB  500 mg Intravenous 3 times per day     • sodium chloride 0.9% infusion 100 mL/hr at 07/06/20 0311       Review of Systems:  Constitutional: (-)generalized weakness, (+)fever, (-)chills  Respiratory:  (-)cough, (-)wheezing, (-)shortness of breath  Cardiovascular: (-)chest pain, (-)palpitations, (-)lower extremity edema  Gastrointestinal:  (+)abdominal pain, (-)distention, (-)nausea, (-)vomiting, (-)diarrhea, (-)constipation, (-)bloody stool, (-)hematemesis  Genitourinary:  (-)dysuria, (-)hematuria, (-)urinary frequency  Musculoskeletal:  (-)myalgias, (-)arthralgias  Integument:  (-)skin rash, (-)sores, (-)open ulcers  Neurologic:  (-)numbness or tingling, (-)asymmetric loss of strength or function of  extremities, (-)balance changes      OBJECTIVE:  Vital 24 Hour Range Most Recent Value   Temperature Temp  Min: 97.8 °F (36.6 °C)  Max: 101.5 °F (38.6 °C) 97.8 °F (36.6 °C)   Pulse Pulse  Min: 80  Max: 94 80   Respiratory Resp  Min: 16  Max: 22 18   Blood Pressure BP  Min: 137/64  Max: 187/84 (!) 146/62   Pulse Oximetry SpO2  Min: 94 %  Max: 96 %    O2 No data recorded      Vital Most Recent Value First Value   Weight 75.6 kg (07/06/20 0517) Weight: 72.8 kg (07/04/20 0852)   BMI Body mass index is 28.61 kg/m². BMI (Calculated): 27.36 (07/04/20 1428)       Intake/Output Summary (Last 24 hours) at 7/6/2020 0715  Last data filed at 7/6/2020 0600  Gross per 24 hour   Intake 2596 ml   Output 1325 ml   Net 1271 ml     Current diet:  Liquid Clear Diet    Physical Exam:  General - Pleasant, alert, calm and cooperative and not in acute distress.  Answers questions appropriately.  Cor - Regular rate and rhythm.  No murmur rubs or gallops.  Warm, well perfused extremities.  Pulm - Clear bilaterally w/o wheezes, rubs or rhonchi.  Normal respiratory effort.  Abd - Soft, mildly tender to deep palpation left lower quadrant without rebound or guarding., nondistended, normoactive bowel sounds  Ext - Warm without clubbing or cyanosis.  No edema.  Skin - No rashes or lesions.  Warm and dry.  No decubitus ulcers.      Ancillary Studies including laboratory results are reviewed as recorded in [facility] 7/6/2020 7:15 AM.  A subset of labs and results are listed below:  lab last 24 hrs;   Recent Results (from the past 24 hour(s))   Comprehensive Metabolic Panel    Collection Time: 07/06/20  6:12 AM   Result Value Ref Range    Fasting Status      Sodium 141 135 - 145 mmol/L    Potassium 3.3 (L) 3.4 - 5.1 mmol/L    Chloride 106 98 - 107 mmol/L    Carbon Dioxide 25 21 - 32 mmol/L    Anion Gap 13 10 - 20 mmol/L    Glucose 122 (H) 65 - 99 mg/dL    BUN 6 6 - 20 mg/dL    Creatinine 0.58 0.51 - 0.95 mg/dL    Glomerular Filtration Rate 86 (L) >90  mL/min/1.73m2    BUN/ Creatinine Ratio 10 7 - 25    Bilirubin, Total 0.6 0.2 - 1.0 mg/dL    GOT/AST 16 <=37 Units/L    Alkaline Phosphatase 69 45 - 117 Units/L    Albumin 2.2 (L) 3.6 - 5.1 g/dL    Protein, Total 6.6 6.4 - 8.2 g/dL    Globulin 4.4 (H) 2.0 - 4.0 g/dL    A/G Ratio 0.5 (L) 1.0 - 2.4    GPT/ALT 12 <64 Units/L    Calcium 8.2 (L) 8.4 - 10.2 mg/dL   CBC with Automated Differential (performable only)    Collection Time: 07/06/20  6:12 AM   Result Value Ref Range    WBC 14.3 (H) 4.2 - 11.0 K/mcL    RBC 3.83 (L) 4.00 - 5.20 mil/mcL    HGB 11.7 (L) 12.0 - 15.5 g/dL    HCT 36.0 36.0 - 46.5 %    MCV 94.0 78.0 - 100.0 fl    MCH 30.5 26.0 - 34.0 pg    MCHC 32.5 32.0 - 36.5 g/dL    RDW-CV 13.0 11.0 - 15.0 %     140 - 450 K/mcL    NRBC 0 <=0 /100 WBC    Neutrophil, Percent 86 %    Lymphocytes, Percent 7 %    Mono, Percent 5 %    Eosinophils, Percent 1 %    Basophils, Percent 0 %    Immature Granulocytes 1 %    Absolute Neutrophils 12.3 (H) 1.8 - 7.7 K/mcL    Absolute Lymphocytes 1.0 1.0 - 4.0 K/mcL    Absolute Monocytes 0.8 0.3 - 0.9 K/mcL    Absolute Eosinophils  0.1 0.1 - 0.5 K/mcL    Absolute Basophils 0.1 0.0 - 0.3 K/mcL    Absolute Immmature Granulocytes 0.1 0.0 - 0.2 K/mcL    RDW-SD 44.4 39.0 - 50.0 fL   Magnesium    Collection Time: 07/06/20  6:12 AM   Result Value Ref Range    Magnesium 2.0 1.7 - 2.4 mg/dL       Ct Chest Abdomen Pelvis W Contrast    Result Date: 7/4/2020  Narrative: CT CHEST ABDOMEN PELVIS W CONTRAST HISTORY:  r/o PE and abdominal etiology for not eating and drinking, confusion COMPARISON:  CT scan of the abdomen 10/12/2011. TECHNIQUE:  Multiple helical axial scans of the chest, abdomen, and pelvis were obtained with oral and intravenous injection of 100 mL of Isovue-300 contrast. FINDINGS:  CT CHEST WITH CONTRAST: Heart is unremarkable. No pericardial effusion. No significant mediastinal adenopathy. There is good opacification of pulmonary arteries. No central emboli. The small  peripheral subsegmental arteries are not well seen. Some this is likely due to respiratory motion. CT ABDOMEN WITH CONTRAST:  Liver appears unremarkable. Normal spleen. Normal pancreas. Both kidneys appear normal. No hydronephrosis. No periaortic adenopathy. Mild demineralization. Hemangiomas are seen in the lumbar spine. Slight compression of L1 of uncertain age. CT PELVIS WITH CONTRAST:  The distal sigmoid sigmoid and perirectal tissues are not well seen. There is poor delineation of fat planes. There appears to be some wall thickening in the distal sigmoid. Several small fluid collections are seen left perirectal region measuring up to 3.7 cm. Few small air-fluid loops are seen. Etiology is uncertain. Can't exclude small abscesses. No significant free fluid. Bladder appears unremarkable. Stable bone lesion right sacrum. Likely small benign 2.3 cm right adnexal/ovarian cyst.     Impression: IMPRESSION: No definite pulmonary embolus. The small peripheral subsegmental arteries are not well seen. Distal sigmoid colon and rectum appears abnormal. It's hard to delineate loops of bowel. Six-month 4.1 cm perirectal fluid collection possibly perirectal abscess/diverticulitis. There is thickening of the distal sigmoid colon suggestive for diverticulitis or colitis. Recommend clinical correlation likely with colonoscopy.     Ct Head Wo Contrast, Ct Cervical Spine Wo Contrast    Result Date: 7/4/2020  Narrative: EXAM:  CT HEAD WO CONTRAST, CT CERVICAL SPINE WO CONTRAST HISTORY:  Altered level of consciousness (LOC), unexplained, injury, status post fall COMPARISON:  None. TECHNIQUE:  Thin-section axial scans were obtained from the skull base to the vertex without intravenous contrast. Thin section imaging through the cervical spine was also obtained. FINDINGS:  Age-related atrophic change is seen. Diminished areas of attenuation are noted bilaterally, notably in the periventricular white matter and most consistent with small  vessel ischemic changes. No acute intracranial hemorrhage or acute cortical infarct. Bilateral basal ganglia calcifications. No abnormal extra-axial fluid collections or midline shift. Intracranial arterial calcifications are seen. No significant sinusitis or mastoiditis. No acute skull fracture. CT of the cervical spine demonstrates multilevel cervical spondylosis. Mild anterior offset of C4 with respect to C5 is noted, likely degenerative in urology. Disc space narrowing at C5-C6 and C6-C7. Mild anterior offset of C7 with respect to T1 also noted, likely degenerative in etiology as well. No acute fracture. Degenerative changes across the atlantoaxial junction seen. Included images of the lung apices are grossly unremarkable.     Impression: IMPRESSION:  No acute intracranial hemorrhage or acute cortical infarct. No acute skull fracture. No acute cervical spine fracture. Cervical spondylosis.    Unresulted Labs (From admission, onward)     Start     Ordered    07/05/20 0600  CBC with Automated Differential  AM DRAW,   AMDR      07/04/20 1442    07/05/20 0600  Magnesium  AM DRAW,   AMDR      07/04/20 1442    07/05/20 0600  Comprehensive Metabolic Panel  AM DRAW,   AMDR      07/04/20 1442    07/05/20 0600  Ammonia  AM DRAW,   AMDR      07/04/20 1541    07/05/20 0600  CBC with Automated Differential (performable only)  PROCEDURE ONCE,   TD      07/04/20 2102    07/04/20 1459  Staphylococcus aureus Methicillin Resistant (MRSA) PCR  ONE TIME,   Routine      07/04/20 1458    07/04/20 1443  Vitamin B12 And Folate  ONE TIME,   Add-On      07/04/20 1442    07/04/20 0906  Blood Culture  EVERY 15 MIN,   STAT      07/04/20 0906              Unresulted Procedure (From admission, onward)    None          Microbiology:  Microbiology Results     None          This note was completed using a voice recognition software. Although all efforts were made to proofread, inadvertent inaccuracies might have occurred. If there are any  questions or concerns regarding the content of this note, please do not hesitate to contact me directly.    Rosas Mcelroy MD  Hospitalist  [facility]  7/6/2020 7:15 AM  \   no weight-bearing restrictions

## 2025-03-28 NOTE — PHYSICAL THERAPY INITIAL EVALUATION ADULT - GENERAL OBSERVATIONS, REHAB EVAL
Patient was seen for Physical Therapy Initial Evaluation in the semi-supine position, in NAD, A&Ox4, with + IV, + PCA pump, + pulse oximeter, + cardiac monitor, + MARSHAL drain, + catheter, and + cervical collar. Vitals taken prior to the start of the session; BP: 111/71 mmHG, HR: 94 bpm, O2: 93%.

## 2025-03-28 NOTE — OCCUPATIONAL THERAPY INITIAL EVALUATION ADULT - IMPAIRED TRANSFERS: SIT/STAND, REHAB EVAL
+dizziness in standing, returned to sitting at edge of bed with reported decreased symptoms. /73 mmHg./impaired balance/decreased strength

## 2025-03-28 NOTE — PROGRESS NOTE ADULT - SUBJECTIVE AND OBJECTIVE BOX
Anesthesia Pain Management Service    SUBJECTIVE: Patient is doing well with IV PCA and no significant problems reported. Repots chronic back and neck pain. Patient states she was on medical marijuana and stopped taking it couple of months ago. Patient also on Flexeril 10mg q8h. Patient states she has difficulty swallowing pills and prefers solution. Reports history of vomiting with Oxycodone.    Pain Scale Score	At rest: ___ 	With Activity: ___ 	[X ] Refer to charted pain scores    THERAPY:    [ ] IV PCA Morphine		[ ] 5 mg/mL	[ ] 1 mg/mL  [X ] IV PCA Hydromorphone	[ ] 5 mg/mL	[X ] 1 mg/mL  [ ] IV PCA Fentanyl		[ ] 50 micrograms/mL    Demand dose __0.2_ lockout __6_ (minutes) Continuous Rate _0__ Total: _6.1__   mg used (in past 24 hrs)      MEDICATIONS  (STANDING):  amLODIPine   Tablet 5 milliGRAM(s) Oral daily  atorvastatin 20 milliGRAM(s) Oral at bedtime  cyclobenzaprine 10 milliGRAM(s) Oral every 8 hours  dextrose 5%. 1000 milliLiter(s) (100 mL/Hr) IV Continuous <Continuous>  dextrose 5%. 1000 milliLiter(s) (50 mL/Hr) IV Continuous <Continuous>  dextrose 50% Injectable 25 Gram(s) IV Push once  dextrose 50% Injectable 12.5 Gram(s) IV Push once  dextrose 50% Injectable 25 Gram(s) IV Push once  gabapentin 100 milliGRAM(s) Oral every 8 hours  glucagon  Injectable 1 milliGRAM(s) IntraMuscular once  insulin lispro (ADMELOG) corrective regimen sliding scale   SubCutaneous three times a day before meals  insulin lispro (ADMELOG) corrective regimen sliding scale   SubCutaneous at bedtime  pantoprazole    Tablet 40 milliGRAM(s) Oral before breakfast  polyethylene glycol 3350 17 Gram(s) Oral daily  senna 2 Tablet(s) Oral at bedtime  sodium chloride 0.9%. 1000 milliLiter(s) (100 mL/Hr) IV Continuous <Continuous>    MEDICATIONS  (PRN):  dextrose Oral Gel 15 Gram(s) Oral once PRN Blood Glucose LESS THAN 70 milliGRAM(s)/deciliter  HYDROmorphone   Solution 2 milliGRAM(s) Oral every 3 hours PRN Moderate Pain (4 - 6)  HYDROmorphone   Solution 4 milliGRAM(s) Oral every 3 hours PRN Severe Pain (7 - 10)  HYDROmorphone  Injectable 0.25 milliGRAM(s) IV Push every 3 hours PRN Severe Breakthrough Pain (7 - 10)  naloxone Injectable 0.1 milliGRAM(s) IV Push every 3 minutes PRN For ANY of the following changes in patient status:  A. RR LESS THAN 10 breaths per minute, B. Oxygen saturation LESS THAN 90%, C. Sedation score of 6  ondansetron Injectable 4 milliGRAM(s) IV Push every 6 hours PRN Nausea      OBJECTIVE: Patient laying in bed.    Sedation Score:	[ X] Alert	[ ] Drowsy 	[ ] Arousable	[ ] Asleep	[ ] Unresponsive    Side Effects:	[X ] None	[ ] Nausea	[ ] Vomiting	[ ] Pruritus  		[ ] Other:    Vital Signs Last 24 Hrs  T(C): 36.8 (28 Mar 2025 10:06), Max: 36.8 (28 Mar 2025 10:06)  T(F): 98.2 (28 Mar 2025 10:06), Max: 98.2 (28 Mar 2025 10:06)  HR: 82 (28 Mar 2025 10:06) (82 - 96)  BP: 107/58 (28 Mar 2025 10:06) (107/58 - 131/75)  BP(mean): 86 (27 Mar 2025 17:00) (82 - 103)  RR: 18 (28 Mar 2025 10:06) (12 - 24)  SpO2: 97% (28 Mar 2025 10:06) (93% - 100%)    Parameters below as of 28 Mar 2025 10:06  Patient On (Oxygen Delivery Method): room air        ASSESSMENT/ PLAN    Therapy to  be:	[ ] Continue   [ X] Discontinued   [X ] Change to prn Analgesics    Documentation and Verification of current medications:   [X] Done	[ ] Not done, not elligible  I STOP Reviewed and found:  Vireo green balm (1:1) 1.4mg thc: 1.4mg cbd/1.25 cc, quantity of 1 for 3 days. Last dispensed on 04/12/24.    Comments: IV PCA discontinued. PRN Oral/IV opioids and/or Adjuvant non-opioid medication to be ordered at this point.    Progress Note written now but Patient was seen earlier.

## 2025-03-28 NOTE — PHYSICAL THERAPY INITIAL EVALUATION ADULT - ACTIVE RANGE OF MOTION EXAMINATION, REHAB EVAL
michael. upper extremity Active ROM was WNL (within normal limits)/bilateral lower extremity Active ROM was WNL (within normal limits)

## 2025-03-28 NOTE — DISCHARGE NOTE NURSING/CASE MANAGEMENT/SOCIAL WORK - FINANCIAL ASSISTANCE
[facility] provides services at a reduced cost to those who are determined to be eligible through [facility]’s financial assistance program. Information regarding [facility]’s financial assistance program can be found by going to [email or web address] or by calling [phone].

## 2025-03-28 NOTE — PHYSICAL THERAPY INITIAL EVALUATION ADULT - PATIENT PROFILE REVIEW, REHAB EVAL
ACTIVITY ORDER - Increase as Tolerated; Out of Bed to Chair./yes ACTIVITY ORDER - Increase as Tolerated; Out of Bed to Chair. Spoke with DESMOND Marin prior to the start of the session -> Patient is appropriate to be seen for Physical Therapy Initial Evaluation./yes

## 2025-03-28 NOTE — OCCUPATIONAL THERAPY INITIAL EVALUATION ADULT - MD ORDER
Occupational therapy to evaluate and treat. Occupational therapy to evaluate and treat.  Increase as tolerated. Out of bed with assistance.

## 2025-03-28 NOTE — DISCHARGE NOTE NURSING/CASE MANAGEMENT/SOCIAL WORK - PATIENT PORTAL LINK FT
You can access the FollowMyHealth Patient Portal offered by [facility] by registering at the following website: [email or web address]. By joining [employer]’s FollowMyHealth portal, you will also be able to view your health information using other applications (apps) compatible with our system.

## 2025-03-28 NOTE — PROGRESS NOTE ADULT - ASSESSMENT
64-yo female w/PMHx of DM2, colonic polyps, HTN, HLD, MVP, and obesity who presents with neck, upper and lower back pain for many yrs. that radiates to scapular and LUE; she is s/p decompression of cervical spine with fusion on 3/27/25      Cervicalgia 2' spinal cord compression  - s/p decompression of cervical spine with fusion on 3/27/25  - monitor MARSHAL drain output  - on dilaudid PCA  - bowel regimen  - incentive spirometer  - OOB as tolerated  - TOV 3/28/25  - PT/OT underway  - appreciate orthopedic intervention and post-op care    DM2 w/unspecified complications  - HgbA1c = 6.2  - fingersticks are acceptable under ISS  - to resume ozempic as outpt    HTN  - BP is currently controlled  - cont amlodipine    HLD  - cont atorvastatin in place of lovastatin    Disposition  - likely home   - PT/OT underway

## 2025-03-28 NOTE — PHYSICAL THERAPY INITIAL EVALUATION ADULT - GROSSLY INTACT, SENSORY
Patient endorses diminished sensation in Left foot and thigh. Bilateral upper and right lower extremity grossly intact. Patient endorses diminished sensation in Left foot and thigh, as well as left hand and forearm. Right upper and right lower extremity grossly intact.

## 2025-03-28 NOTE — DISCHARGE NOTE PROVIDER - CARE PROVIDER_API CALL
Zhao Zamora  Orthopaedic Surgery  [address]  [place] [address]  Phone: [phone]  Fax: ()-  Established Patient  Follow Up Time:    Zhao Zamora  Orthopaedic Surgery  [address]  [place] [address]  Phone: [phone]  Fax: ()-  Follow Up Time:

## 2025-03-28 NOTE — PROGRESS NOTE ADULT - ASSESSMENT
Pt is a  with  64y y/o  Female s/p C3-7 PSF. Patient is hemodynamically stable; recovering appropriately from orthopaedic standpoint.  -    Multimodal Pain control : PCA + tylenol for now  -    Garcia to be removed when out of bed  -    DVT ppx: Venodynes, ambulation   -    Resumed home meds  -    Check AM labs  -    Monitor MARSHAL output  -    Weight bearing status: WBAT  -    PT/OT  -    Dispo:  TBD        Orthopaedic Surgery  [facility] [id number]  [facility]        [id number]  [facility]  [id number]/ [phone]    Pt is a  with  64y y/o  Female s/p C3-7 PSF. Patient is hemodynamically stable; recovering appropriately from orthopaedic standpoint.  -        Multimodal Pain control : PCA + tylenol for now  -    Garcia to be removed when out of bed  -    DVT ppx: Venodynes, ambulation   -    Resumed home meds  -    Check AM labs  -    Monitor MARSHAL output  -    Weight bearing status: WBAT  -    PT/OT  -    Dispo:  TBD        [place]  [facility] [id number]  [facility]        [id number]  [facility]  [id number]/ [phone]    Doing well this am. Pain controlled Pain down arm im proved.  Numbness in left hand persists  Strength stable from preop.     PT/OOB  Transition to PO meds  Garcia out this am  monitor drain output  Vania Zamora MD

## 2025-03-28 NOTE — PHYSICAL THERAPY INITIAL EVALUATION ADULT - NSPTDMEREC_GEN_A_CORE
Patient will require the use of a rolling walker to perform her MRADLs with increased safety within the home environment./rolling walker

## 2025-03-28 NOTE — PHYSICAL THERAPY INITIAL EVALUATION ADULT - PERTINENT HX OF CURRENT PROBLEM, REHAB EVAL
Patient is a 64-year-old female presenting with neck, upper and lower back pain for many years that radiates to scapular and Left Upper Extremity. Patient reports having pain into left hand. Denies change in dexterity or fine motor skills. Patient with chronic low back pain and Bilateral Lower extremity radicular pain that has been managed with PT, LESI and muscle relaxants. Patient does reports buckling at times when walking long distances.

## 2025-03-28 NOTE — DISCHARGE NOTE PROVIDER - NSDCFUSCHEDAPPT_GEN_ALL_CORE_FT
Zhao Zamora  [facility] Physician Replaced by [facility]  [facility] [address]  Scheduled Appointment: 04/18/2025

## 2025-03-28 NOTE — DISCHARGE NOTE PROVIDER - HOSPITAL COURSE
63 yo s/p C3-7 posterior decompression/ fusion with Dr Zamora. Patient tolerated the procedure well without any complications.  Patient tolerated physical therapy well and pain is controlled.   A medical co-management attending has followed patient  for continuity of care and management and cleared for safe discharge.  Please follow up with Dr Zamora in 1-2 weeks.  Call office to make an appointment [phone].  Please follow up with your primary care physician as medications may have changed.  Please avoid any NSAIDS, aspirin or anti-inflammatory medications unless otherwise specified by your surgeon.  Avoid any heavy lifting, bending, squatting, twisting motion,  Keep dressing and/or incision clean and dry.  Remove dressing in 2 days.   Patient may shower, please avoid aiming shower stream directly onto incision.  Sutures/staples to be removed at office postop visit POD 14.  Patient is weight bear as tolerated.  Please notify Ortho with any questions. 63 yo s/p C3-7 posterior decompression/ fusion with Dr. Zamora on 3/27 and tolerated the procedure well. Please avoid any NSAIDS, aspirin or anti-inflammatory medications unless otherwise specified by your surgeon. Avoid any heavy lifting, bending, squatting, and twisting motions. Keep dressing and/or incision clean and dry. Remove dressing in 2 days. Patient may shower, please avoid aiming shower stream directly onto incision. Sutures/staples to be removed at office postop visit POD 14. Patient is weight bearing as tolerated. Please follow up with Dr. Zamora in 1-2 weeks. Please follow up with your primary care physician as medications may have changed.

## 2025-03-28 NOTE — DISCHARGE NOTE NURSING/CASE MANAGEMENT/SOCIAL WORK - NSDCPNINST_GEN_ALL_CORE
You have a post-op appt scheduled with Dr. Zamora on April 18th, 202 at 10:00 AM.  Maintain incision and dressing clean dry and intact, with cervical collar in place as per MD for support. . Call MD with any signs of infection ie fever, redness, drainage, or with signs of bleeding, unrelieved increased pain, or persistent nausea. Avoid twisting motion and remember to logroll to turn in bed. Continue to drink plenty of fluids. Avoid strenuous activity and constipation which may be a side effect from taking certain medications and narcotics. No heavy lifting greater than 10 pounds, ie. a gallon of milk. Safety and fall prevention measures reinforced. Follow-up with MD in office as instructed.  Continue Diabetes management, diet and glucose monitoring, know your A1C blood level (A1C was 6.1 pre-Op) and follow up with PMD for continuity of care. Remember hand hygiene, skin inspection for prevention of infection.

## 2025-03-28 NOTE — PROGRESS NOTE ADULT - SUBJECTIVE AND OBJECTIVE BOX
Orthopaedic Surgery Progress Note     Subjective     Patient seen and examined at bedside. Patient resting comfortably on morning rounds. Pt doing generally well.    Pain well controlled with medication.  Denies any radation of pain of the LUE which was her pre-op symptom      Physical Exam:  Vital Signs Last 24 Hrs  T(C): 36.4 (28 Mar 2025 06:11), Max: 36.7 (27 Mar 2025 18:25)  T(F): 97.6 (28 Mar 2025 06:11), Max: 98 (27 Mar 2025 18:25)  HR: 95 (28 Mar 2025 06:11) (86 - 96)  BP: 123/76 (28 Mar 2025 06:11) (109/75 - 131/75)  BP(mean): 86 (27 Mar 2025 17:00) (82 - 103)  RR: 18 (28 Mar 2025 06:11) (12 - 24)  SpO2: 100% (28 Mar 2025 06:11) (93% - 100%)    Parameters below as of 28 Mar 2025 06:11  Patient On (Oxygen Delivery Method): nasal cannula  O2 Flow (L/min): 2      Gen: NAD  Resp: Non labored breathing  Spine:  Dressing clean/dry/intact  Drain: HV/MARSHAL serosanguinous with good suction    Motor:              C5(Del/Bi)         C6(EF/WE)           C7 (EE/WF)           C8 (FDS)           T1 (FAbd)  R            5/5                    5/5                        5/5                           5/5                   5/5  L             5/5                    5/5                        5/5                            5/5                  5/5                L2 (IP)            L3 (Quad)            L4 (TA)               L5 (EHL)            S1(Gas)        S2(FHL)  R           5/5                       5/5                 5/5                       5/5                    5/5                5/5  L           5/5                       5/5                 5/5                       5/5                    5/5                5/5    Sensory:            C5         C6         C7      C8       T1        (0=absent, 1=impaired, 2=normal, NT=not testable)  R         2            2           2        2         2  L          2            2           2        2         2               L2          L3         L4      L5       S1         (0=absent, 1=impaired, 2=normal, NT=not testable)  R         2            2            2        2        2  L          2            2           2        2         2    Extremities warm and well perfused bilaterally     LABS:                        11.4   x     )-----------( 267      ( 28 Mar 2025 05:25 )             35.9     03-28    137  |  103  |  12  ----------------------------<  115[H]  5.0   |  24  |  0.68    Ca    8.9      28 Mar 2025 05:25

## 2025-03-28 NOTE — DISCHARGE NOTE PROVIDER - NSDCCPCAREPLAN_GEN_ALL_CORE_FT
PRINCIPAL DISCHARGE DIAGNOSIS  Diagnosis: H/O spinal cord compression  Assessment and Plan of Treatment:

## 2025-03-28 NOTE — DISCHARGE NOTE NURSING/CASE MANAGEMENT/SOCIAL WORK - NSDCPECAREGIVERED_GEN_ALL_CORE
Medline and carenotes for surgical procedure Posterior Cervical spine fusion, Spine precautions, incision care, Diabetes A1C, pain mgt, Flexeril, Wayne, Dilaudid, as well as DC Medications and side effects literature for patient reference.

## 2025-03-28 NOTE — CHART NOTE - NSCHARTNOTEFT_GEN_A_CORE
Patient requires a 3 in 1 commode. She is s/p C3-C7 PSF. Patient is confined to a single floor in a home and there is no bathroom on that floor.     Jigna Hernandez PA-C  Orthopedic Joint and Spine Coordinator  [facility]  [phone]

## 2025-03-28 NOTE — DISCHARGE NOTE PROVIDER - NSDCCPTREATMENT_GEN_ALL_CORE_FT
PRINCIPAL PROCEDURE  Procedure: Decompression of cervical spine with fusion  Findings and Treatment:

## 2025-03-28 NOTE — PROGRESS NOTE ADULT - SUBJECTIVE AND OBJECTIVE BOX
Pt states her pain is well-controlled this morning  No acute pain  No BMs yet  (+)Flatus    Vital Signs Last 24 Hrs  T(C): 36.4 (28 Mar 2025 06:11), Max: 36.7 (27 Mar 2025 18:25)  T(F): 97.6 (28 Mar 2025 06:11), Max: 98 (27 Mar 2025 18:25)  HR: 95 (28 Mar 2025 06:11) (86 - 96)  BP: 123/76 (28 Mar 2025 06:11) (109/75 - 131/75)  BP(mean): 86 (27 Mar 2025 17:00) (82 - 103)  RR: 18 (28 Mar 2025 06:11) (12 - 24)  SpO2: 100% (28 Mar 2025 06:11) (93% - 100%)    I&O's Summary    03-27-25 @ 07:01  -  03-28-25 @ 07:00  --------------------------------------------------------  IN: 1900 mL / OUT: 1400 mL / NET: 500 mL      Gen: NAD  Head: NCAT, EOMI  Lungs: CTA b/l  Heart: RRR, nl S1/S2, no murmurs  Abd: soft, NTND, NABS  Neuro: A+O x 3    LABS:                        11.4   14.53 )-----------( 267      ( 28 Mar 2025 05:25 )             35.9     03-28    137  |  103  |  12  ----------------------------<  115[H]  5.0   |  24  |  0.68    Ca    8.9      28 Mar 2025 05:25        CAPILLARY BLOOD GLUCOSE      POCT Blood Glucose.: 114 mg/dL (28 Mar 2025 07:20)  POCT Blood Glucose.: 119 mg/dL (27 Mar 2025 23:19)  POCT Blood Glucose.: 154 mg/dL (27 Mar 2025 18:22)  POCT Blood Glucose.: 160 mg/dL (27 Mar 2025 17:56)  POCT Blood Glucose.: 126 mg/dL (27 Mar 2025 13:45)  POCT Blood Glucose.: 120 mg/dL (27 Mar 2025 12:48)        Urinalysis Basic - ( 28 Mar 2025 05:25 )    Color: x / Appearance: x / SG: x / pH: x  Gluc: 115 mg/dL / Ketone: x  / Bili: x / Urobili: x   Blood: x / Protein: x / Nitrite: x   Leuk Esterase: x / RBC: x / WBC x   Sq Epi: x / Non Sq Epi: x / Bacteria: x        RADIOLOGY & ADDITIONAL TESTS:    Imaging Personally Reviewed:  [x] YES  [ ] NO    Case discussed with NPP:  [X] YES  [ ] NO

## 2025-03-28 NOTE — PHYSICAL THERAPY INITIAL EVALUATION ADULT - NSPTDISCHREC_GEN_A_CORE
No skilled PT needs Anticipating no needs. Please follow PT notes closely./No skilled PT needs To address identified impairments./Outpatient PT

## 2025-03-28 NOTE — DISCHARGE NOTE PROVIDER - NSDCMRMEDTOKEN_GEN_ALL_CORE_FT
amLODIPine 5 mg oral tablet: 1 tab(s) orally once a day  lovastatin 40 mg oral tablet: 2 tab(s) orally once a day  naproxen 500 mg oral tablet: 1 tab(s) orally 2 times a day  Ozempic 2 mg/1.5 mL (0.25 mg or 0.5 mg dose) subcutaneous solution: 0.25 milligram(s) subcutaneously once a week Last dose of Ozempic  3/8/25   amLODIPine 5 mg oral tablet: 1 tab(s) orally once a day  gabapentin 250 mg/5 mL oral solution: 6 milliliter(s) orally 3 times a day  HYDROmorphone 1 mg/mL oral liquid: 2 milliliter(s) orally every 3 hours As needed Moderate Pain (4 - 6)  HYDROmorphone 1 mg/mL oral liquid: 4 milliliter(s) orally every 3 hours As needed Severe Pain (7 - 10)  lovastatin 40 mg oral tablet: 2 tab(s) orally once a day  Ozempic 2 mg/1.5 mL (0.25 mg or 0.5 mg dose) subcutaneous solution: 0.25 milligram(s) subcutaneously once a week Last dose of Ozempic  3/8/25  pantoprazole 40 mg oral delayed release tablet: 1 tab(s) orally once a day (before a meal)  polyethylene glycol 3350 oral powder for reconstitution: 17 gram(s) orally 2 times a day  senna leaf extract oral tablet: 2 tab(s) orally once a day (at bedtime)

## 2025-03-28 NOTE — OCCUPATIONAL THERAPY INITIAL EVALUATION ADULT - GENERAL OBSERVATIONS, REHAB EVAL
Patient received semisupine in bed in NAD; agreeable to participate in OT evaluation. +MARSHAL drain. +IV. +PCA pump. +Garcia. +cervical collar. BP: 111/71 mmHg.

## 2025-03-29 LAB
ANION GAP SERPL CALC-SCNC: 13 MMOL/L — SIGNIFICANT CHANGE UP (ref 7–14)
BUN SERPL-MCNC: 12 MG/DL — SIGNIFICANT CHANGE UP (ref 7–23)
CALCIUM SERPL-MCNC: 9.3 MG/DL — SIGNIFICANT CHANGE UP (ref 8.4–10.5)
CHLORIDE SERPL-SCNC: 103 MMOL/L — SIGNIFICANT CHANGE UP (ref 98–107)
CO2 SERPL-SCNC: 24 MMOL/L — SIGNIFICANT CHANGE UP (ref 22–31)
CREAT SERPL-MCNC: 0.74 MG/DL — SIGNIFICANT CHANGE UP (ref 0.5–1.3)
EGFR: 91 ML/MIN/1.73M2 — SIGNIFICANT CHANGE UP
EGFR: 91 ML/MIN/1.73M2 — SIGNIFICANT CHANGE UP
GLUCOSE BLDC GLUCOMTR-MCNC: 103 MG/DL — HIGH (ref 70–99)
GLUCOSE BLDC GLUCOMTR-MCNC: 106 MG/DL — HIGH (ref 70–99)
GLUCOSE BLDC GLUCOMTR-MCNC: 108 MG/DL — HIGH (ref 70–99)
GLUCOSE BLDC GLUCOMTR-MCNC: 89 MG/DL — SIGNIFICANT CHANGE UP (ref 70–99)
GLUCOSE SERPL-MCNC: 105 MG/DL — HIGH (ref 70–99)
HCT VFR BLD CALC: 36.8 % — SIGNIFICANT CHANGE UP (ref 34.5–45)
HGB BLD-MCNC: 11.5 G/DL — SIGNIFICANT CHANGE UP (ref 11.5–15.5)
MCHC RBC-ENTMCNC: 27.5 PG — SIGNIFICANT CHANGE UP (ref 27–34)
MCHC RBC-ENTMCNC: 31.3 G/DL — LOW (ref 32–36)
MCV RBC AUTO: 88 FL — SIGNIFICANT CHANGE UP (ref 80–100)
NRBC # BLD AUTO: 0 K/UL — SIGNIFICANT CHANGE UP (ref 0–0)
NRBC # FLD: 0 K/UL — SIGNIFICANT CHANGE UP (ref 0–0)
NRBC BLD AUTO-RTO: 0 /100 WBCS — SIGNIFICANT CHANGE UP (ref 0–0)
PLATELET # BLD AUTO: 261 K/UL — SIGNIFICANT CHANGE UP (ref 150–400)
POTASSIUM SERPL-MCNC: 4.5 MMOL/L — SIGNIFICANT CHANGE UP (ref 3.5–5.3)
POTASSIUM SERPL-SCNC: 4.5 MMOL/L — SIGNIFICANT CHANGE UP (ref 3.5–5.3)
RBC # BLD: 4.18 M/UL — SIGNIFICANT CHANGE UP (ref 3.8–5.2)
RBC # FLD: 14.4 % — SIGNIFICANT CHANGE UP (ref 10.3–14.5)
SODIUM SERPL-SCNC: 140 MMOL/L — SIGNIFICANT CHANGE UP (ref 135–145)
WBC # BLD: 10.25 K/UL — SIGNIFICANT CHANGE UP (ref 3.8–10.5)
WBC # FLD AUTO: 10.25 K/UL — SIGNIFICANT CHANGE UP (ref 3.8–10.5)

## 2025-03-29 PROCEDURE — 93010 ELECTROCARDIOGRAM REPORT: CPT

## 2025-03-29 RX ADMIN — Medication 0.25 MILLIGRAM(S): at 13:17

## 2025-03-29 RX ADMIN — ATORVASTATIN CALCIUM 20 MILLIGRAM(S): 80 TABLET, FILM COATED ORAL at 21:10

## 2025-03-29 RX ADMIN — Medication 4 MILLIGRAM(S): at 08:22

## 2025-03-29 RX ADMIN — Medication 4 MILLIGRAM(S): at 21:45

## 2025-03-29 RX ADMIN — Medication 0.25 MILLIGRAM(S): at 05:00

## 2025-03-29 RX ADMIN — CYCLOBENZAPRINE HYDROCHLORIDE 10 MILLIGRAM(S): 15 CAPSULE, EXTENDED RELEASE ORAL at 14:26

## 2025-03-29 RX ADMIN — Medication 4 MILLIGRAM(S): at 03:30

## 2025-03-29 RX ADMIN — Medication 2 TABLET(S): at 21:10

## 2025-03-29 RX ADMIN — CYCLOBENZAPRINE HYDROCHLORIDE 10 MILLIGRAM(S): 15 CAPSULE, EXTENDED RELEASE ORAL at 05:00

## 2025-03-29 RX ADMIN — Medication 0.25 MILLIGRAM(S): at 05:15

## 2025-03-29 RX ADMIN — CYCLOBENZAPRINE HYDROCHLORIDE 10 MILLIGRAM(S): 15 CAPSULE, EXTENDED RELEASE ORAL at 21:10

## 2025-03-29 RX ADMIN — Medication 4 MILLIGRAM(S): at 02:30

## 2025-03-29 RX ADMIN — Medication 4 MILLIGRAM(S): at 10:37

## 2025-03-29 RX ADMIN — Medication 0.25 MILLIGRAM(S): at 12:10

## 2025-03-29 RX ADMIN — Medication 4 MILLIGRAM(S): at 20:58

## 2025-03-29 RX ADMIN — POLYETHYLENE GLYCOL 3350 17 GRAM(S): 17 POWDER, FOR SOLUTION ORAL at 12:10

## 2025-03-29 RX ADMIN — Medication 4 MILLIGRAM(S): at 11:51

## 2025-03-29 RX ADMIN — Medication 4 MILLIGRAM(S): at 07:37

## 2025-03-29 RX ADMIN — Medication 4 MILLIGRAM(S): at 17:24

## 2025-03-29 RX ADMIN — AMLODIPINE BESYLATE 5 MILLIGRAM(S): 10 TABLET ORAL at 05:01

## 2025-03-29 RX ADMIN — Medication 40 MILLIGRAM(S): at 05:01

## 2025-03-29 RX ADMIN — Medication 4 MILLIGRAM(S): at 17:05

## 2025-03-29 NOTE — PROGRESS NOTE ADULT - ASSESSMENT
64-yo female w/PMHx of DM2, colonic polyps, HTN, HLD, MVP, and obesity who presents with neck, upper and lower back pain for many yrs. that radiates to scapular and LUE; she is s/p decompression of cervical spine with fusion on 3/27/25      Cervicalgia 2' spinal cord compression  - s/p decompression of cervical spine with fusion on 3/27/25  - monitor MARSHAL drain output  - on dilaudid IVP/oral prn  - bowel regimen  - incentive spirometer  - OOB as tolerated  - TOV 3/28/25 --> passed  - PT/OT underway --> outpt PT + no skilled OT needs  - appreciate orthopedic intervention and post-op care    DM2 w/unspecified complications  - HgbA1c = 6.2  - fingersticks are acceptable under ISS  - to resume ozempic as outpt    HTN  - BP is currently controlled  - cont amlodipine    HLD  - cont atorvastatin in place of lovastatin    Disposition  - likely home   - PT/OT underway --> outpt PT + no skilled OT needs

## 2025-03-29 NOTE — PROGRESS NOTE ADULT - SUBJECTIVE AND OBJECTIVE BOX
Pt states her pain is well-controlled this morning  No acute SOB or CP  Tolerating diet    Vital Signs Last 24 Hrs  T(C): 37.3 (29 Mar 2025 05:00), Max: 37.3 (29 Mar 2025 01:36)  T(F): 99.1 (29 Mar 2025 05:00), Max: 99.1 (29 Mar 2025 01:36)  HR: 109 (29 Mar 2025 05:00) (82 - 109)  BP: 128/72 (29 Mar 2025 05:00) (107/58 - 128/76)  BP(mean): --  RR: 18 (29 Mar 2025 05:00) (18 - 20)  SpO2: 93% (29 Mar 2025 05:00) (93% - 100%)    I&O's Summary    03-28-25 @ 07:01  -  03-29-25 @ 07:00  --------------------------------------------------------  IN: 0 mL / OUT: 1357 mL / NET: -1357 mL        Gen: NAD  Head: NCAT, EOMI  Lungs: CTA b/l  Heart: RRR, nl S1/S2, no murmurs  Abd: soft, NTND, NABS  Neuro: A+O x 3      LABS:                        11.5   10.25 )-----------( 261      ( 29 Mar 2025 05:18 )             36.8     03-29    140  |  103  |  12  ----------------------------<  105[H]  4.5   |  24  |  0.74    Ca    9.3      29 Mar 2025 05:18        CAPILLARY BLOOD GLUCOSE      POCT Blood Glucose.: 103 mg/dL (29 Mar 2025 07:12)  POCT Blood Glucose.: 107 mg/dL (28 Mar 2025 21:48)  POCT Blood Glucose.: 120 mg/dL (28 Mar 2025 16:32)  POCT Blood Glucose.: 135 mg/dL (28 Mar 2025 11:30)        Urinalysis Basic - ( 29 Mar 2025 05:18 )    Color: x / Appearance: x / SG: x / pH: x  Gluc: 105 mg/dL / Ketone: x  / Bili: x / Urobili: x   Blood: x / Protein: x / Nitrite: x   Leuk Esterase: x / RBC: x / WBC x   Sq Epi: x / Non Sq Epi: x / Bacteria: x        RADIOLOGY & ADDITIONAL TESTS:    Imaging Personally Reviewed:  [x] YES  [ ] NO    Case discussed with NPP:  [X] YES  [ ] NO

## 2025-03-29 NOTE — PROGRESS NOTE ADULT - ASSESSMENT
Pt is a  with  64y y/o  Female s/p C3-7 PSF. Patient is hemodynamically stable; recovering appropriately from orthopaedic standpoint.    PLAN  -    WBAT  -    DVT ppx: Venodynes, ambulation   -    Resumed home meds  -    Check AM labs  -    Monitor MARSHAL output  -    Weight bearing status: WBAT  -    PT/OT  -    Dispo:  Home

## 2025-03-29 NOTE — PROGRESS NOTE ADULT - SUBJECTIVE AND OBJECTIVE BOX
ORTHOPAEDIC PROGRESS NOTE    SUBJECTIVE:  Pt seen and examined at bedside this am.  Doing well. Neck pain around 4 Am resolving with medication. Otherwise no acute complaints     OBJECTIVE:  Vital Signs Last 24 Hrs  T(C): 37.3 (29 Mar 2025 05:00), Max: 37.3 (29 Mar 2025 01:36)  T(F): 99.1 (29 Mar 2025 05:00), Max: 99.1 (29 Mar 2025 01:36)  HR: 109 (29 Mar 2025 05:00) (82 - 109)  BP: 128/72 (29 Mar 2025 05:00) (107/58 - 128/76)  BP(mean): --  RR: 18 (29 Mar 2025 05:00) (18 - 20)  SpO2: 93% (29 Mar 2025 05:00) (93% - 100%)    Parameters below as of 29 Mar 2025 05:00  Patient On (Oxygen Delivery Method): room air        Physical Exam:  General: NAD; resting comfrotably in bed  Resp: non labored  Spine:  Dressing clean/dry/intact  Drain: HV/MARSHAL serosanguinous with good suction    Motor:              C5(Del/Bi)         C6(EF/WE)           C7 (EE/WF)           C8 (FDS)           T1 (FAbd)  R            5/5                    5/5                        5/5                           5/5                   5/5  L             5/5                    5/5                        5/5                            5/5                  5/5                L2 (IP)            L3 (Quad)            L4 (TA)               L5 (EHL)            S1(Gas)        S2(FHL)  R           5/5                       5/5                 5/5                       5/5                    5/5                5/5  L           5/5                       5/5                 5/5                       5/5                    5/5                5/5    Sensory:            C5         C6         C7      C8       T1        (0=absent, 1=impaired, 2=normal, NT=not testable)  R         2            2           2        2         2  L          2            2           2        2         2               L2          L3         L4      L5       S1         (0=absent, 1=impaired, 2=normal, NT=not testable)  R         2            2            2        2        2  L          2            2           2        2         2        LABS                        11.5   10.25 )-----------( 261      ( 29 Mar 2025 05:18 )             36.8       03-28    137  |  103  |  12  ----------------------------<  115[H]  5.0   |  24  |  0.68    Ca    8.9      28 Mar 2025 05:25            I&O's Summary    28 Mar 2025 07:01  -  29 Mar 2025 07:00  --------------------------------------------------------  IN: 0 mL / OUT: 1357 mL / NET: -1357 mL

## 2025-03-30 LAB
ANION GAP SERPL CALC-SCNC: 9 MMOL/L — SIGNIFICANT CHANGE UP (ref 7–14)
BUN SERPL-MCNC: 10 MG/DL — SIGNIFICANT CHANGE UP (ref 7–23)
CALCIUM SERPL-MCNC: 8.8 MG/DL — SIGNIFICANT CHANGE UP (ref 8.4–10.5)
CHLORIDE SERPL-SCNC: 101 MMOL/L — SIGNIFICANT CHANGE UP (ref 98–107)
CO2 SERPL-SCNC: 28 MMOL/L — SIGNIFICANT CHANGE UP (ref 22–31)
CREAT SERPL-MCNC: 0.59 MG/DL — SIGNIFICANT CHANGE UP (ref 0.5–1.3)
EGFR: 101 ML/MIN/1.73M2 — SIGNIFICANT CHANGE UP
EGFR: 101 ML/MIN/1.73M2 — SIGNIFICANT CHANGE UP
GLUCOSE BLDC GLUCOMTR-MCNC: 102 MG/DL — HIGH (ref 70–99)
GLUCOSE BLDC GLUCOMTR-MCNC: 102 MG/DL — HIGH (ref 70–99)
GLUCOSE BLDC GLUCOMTR-MCNC: 105 MG/DL — HIGH (ref 70–99)
GLUCOSE BLDC GLUCOMTR-MCNC: 127 MG/DL — HIGH (ref 70–99)
GLUCOSE SERPL-MCNC: 106 MG/DL — HIGH (ref 70–99)
HCT VFR BLD CALC: 34.9 % — SIGNIFICANT CHANGE UP (ref 34.5–45)
HGB BLD-MCNC: 11.2 G/DL — LOW (ref 11.5–15.5)
MCHC RBC-ENTMCNC: 28.1 PG — SIGNIFICANT CHANGE UP (ref 27–34)
MCHC RBC-ENTMCNC: 32.1 G/DL — SIGNIFICANT CHANGE UP (ref 32–36)
MCV RBC AUTO: 87.5 FL — SIGNIFICANT CHANGE UP (ref 80–100)
NRBC # BLD AUTO: 0 K/UL — SIGNIFICANT CHANGE UP (ref 0–0)
NRBC # FLD: 0 K/UL — SIGNIFICANT CHANGE UP (ref 0–0)
NRBC BLD AUTO-RTO: 0 /100 WBCS — SIGNIFICANT CHANGE UP (ref 0–0)
PLATELET # BLD AUTO: 248 K/UL — SIGNIFICANT CHANGE UP (ref 150–400)
POTASSIUM SERPL-MCNC: 4.1 MMOL/L — SIGNIFICANT CHANGE UP (ref 3.5–5.3)
POTASSIUM SERPL-SCNC: 4.1 MMOL/L — SIGNIFICANT CHANGE UP (ref 3.5–5.3)
RBC # BLD: 3.99 M/UL — SIGNIFICANT CHANGE UP (ref 3.8–5.2)
RBC # FLD: 14.2 % — SIGNIFICANT CHANGE UP (ref 10.3–14.5)
SODIUM SERPL-SCNC: 138 MMOL/L — SIGNIFICANT CHANGE UP (ref 135–145)
WBC # BLD: 8.91 K/UL — SIGNIFICANT CHANGE UP (ref 3.8–10.5)
WBC # FLD AUTO: 8.91 K/UL — SIGNIFICANT CHANGE UP (ref 3.8–10.5)

## 2025-03-30 RX ADMIN — Medication 0.25 MILLIGRAM(S): at 02:37

## 2025-03-30 RX ADMIN — CYCLOBENZAPRINE HYDROCHLORIDE 10 MILLIGRAM(S): 15 CAPSULE, EXTENDED RELEASE ORAL at 22:02

## 2025-03-30 RX ADMIN — CYCLOBENZAPRINE HYDROCHLORIDE 10 MILLIGRAM(S): 15 CAPSULE, EXTENDED RELEASE ORAL at 13:47

## 2025-03-30 RX ADMIN — Medication 4 MILLIGRAM(S): at 18:36

## 2025-03-30 RX ADMIN — Medication 4 MILLIGRAM(S): at 22:45

## 2025-03-30 RX ADMIN — Medication 4 MILLIGRAM(S): at 06:22

## 2025-03-30 RX ADMIN — Medication 4 MILLIGRAM(S): at 17:36

## 2025-03-30 RX ADMIN — AMLODIPINE BESYLATE 5 MILLIGRAM(S): 10 TABLET ORAL at 05:35

## 2025-03-30 RX ADMIN — Medication 4 MILLIGRAM(S): at 11:49

## 2025-03-30 RX ADMIN — Medication 4 MILLIGRAM(S): at 12:49

## 2025-03-30 RX ADMIN — ATORVASTATIN CALCIUM 20 MILLIGRAM(S): 80 TABLET, FILM COATED ORAL at 22:02

## 2025-03-30 RX ADMIN — Medication 4 MILLIGRAM(S): at 05:31

## 2025-03-30 RX ADMIN — Medication 4 MILLIGRAM(S): at 23:45

## 2025-03-30 RX ADMIN — CYCLOBENZAPRINE HYDROCHLORIDE 10 MILLIGRAM(S): 15 CAPSULE, EXTENDED RELEASE ORAL at 07:36

## 2025-03-30 RX ADMIN — Medication 0.25 MILLIGRAM(S): at 09:26

## 2025-03-30 RX ADMIN — Medication 0.25 MILLIGRAM(S): at 10:00

## 2025-03-30 RX ADMIN — Medication 0.25 MILLIGRAM(S): at 02:09

## 2025-03-30 RX ADMIN — Medication 40 MILLIGRAM(S): at 05:35

## 2025-03-30 NOTE — PROGRESS NOTE ADULT - SUBJECTIVE AND OBJECTIVE BOX
Pt states her pain is well-controlled this morning  No acute pain  No fevers or chills  Slept fitfully overnight    Vital Signs Last 24 Hrs  T(C): 36.9 (30 Mar 2025 05:29), Max: 37.7 (29 Mar 2025 17:08)  T(F): 98.4 (30 Mar 2025 05:29), Max: 99.8 (29 Mar 2025 17:08)  HR: 99 (30 Mar 2025 05:29) (99 - 108)  BP: 114/63 (30 Mar 2025 05:29) (108/61 - 131/72)  BP(mean): --  RR: 17 (30 Mar 2025 05:29) (17 - 18)  SpO2: 98% (30 Mar 2025 05:29) (88% - 100%)    I&O's Summary    03-29-25 @ 07:01  -  03-30-25 @ 07:00  --------------------------------------------------------  IN: 0 mL / OUT: 805 mL / NET: -805 mL        Gen: NAD  Head: NCAT, EOMI  Lungs: CTA b/l  Heart: RRR, nl S1/S2, no murmurs  Abd: soft, NTND, NABS  Neuro: A+O x 3    LABS:                        11.2   8.91  )-----------( 248      ( 30 Mar 2025 07:14 )             34.9     03-30    138  |  101  |  10  ----------------------------<  106[H]  4.1   |  28  |  0.59    Ca    8.8      30 Mar 2025 07:14        CAPILLARY BLOOD GLUCOSE      POCT Blood Glucose.: 105 mg/dL (30 Mar 2025 07:10)  POCT Blood Glucose.: 108 mg/dL (29 Mar 2025 21:09)  POCT Blood Glucose.: 106 mg/dL (29 Mar 2025 16:40)  POCT Blood Glucose.: 89 mg/dL (29 Mar 2025 11:49)        Urinalysis Basic - ( 30 Mar 2025 07:14 )    Color: x / Appearance: x / SG: x / pH: x  Gluc: 106 mg/dL / Ketone: x  / Bili: x / Urobili: x   Blood: x / Protein: x / Nitrite: x   Leuk Esterase: x / RBC: x / WBC x   Sq Epi: x / Non Sq Epi: x / Bacteria: x        RADIOLOGY & ADDITIONAL TESTS:    Imaging Personally Reviewed:  [x] YES  [ ] NO    Case discussed with NPP:  [X] YES  [ ] NO

## 2025-03-30 NOTE — PROGRESS NOTE ADULT - ASSESSMENT
64-yo female w/PMHx of DM2, colonic polyps, HTN, HLD, MVP, and obesity who presents with neck, upper and lower back pain for many yrs. that radiates to scapular and LUE; she is s/p decompression of cervical spine with fusion on 3/27/25      Cervicalgia 2' spinal cord compression  - s/p decompression of cervical spine with fusion on 3/27/25  - MARSHAL removed 3/30/25  - on dilaudid IVP/oral prn  - bowel regimen  - incentive spirometer  - OOB as tolerated  - TOV 3/28/25 --> passed  - PT/OT underway --> outpt PT + no skilled OT needs  - appreciate orthopedic intervention and post-op care    DM2 w/unspecified complications  - HgbA1c = 6.2  - fingersticks are acceptable under ISS  - to resume ozempic as outpt    HTN  - BP is currently controlled  - cont amlodipine    HLD  - cont atorvastatin in place of lovastatin    Disposition  - likely home 3/31/25  - PT/OT underway --> outpt PT + no skilled OT needs

## 2025-03-30 NOTE — PROGRESS NOTE ADULT - ASSESSMENT
Pt is a  with  64y y/o  Female s/p C3-7 PSF. Patient is hemodynamically stable; recovering appropriately from orthopaedic standpoint.    PLAN  -    WBAT  -    DVT ppx: Venodynes, ambulation   -    Resumed home meds  -    Check AM labs  -    Monitor MARSHAL output. MARSHAL DC 3/30  -    Weight bearing status: WBAT  -    PT/OT  -    Dispo:  Home 3/31

## 2025-03-30 NOTE — PROGRESS NOTE ADULT - SUBJECTIVE AND OBJECTIVE BOX
ORTHOPAEDIC PROGRESS NOTE    SUBJECTIVE:  Pt seen and examined at bedside this am.  Doing well.  No acute events overnight.  Pt states pain is well controlled    OBJECTIVE:  Vital Signs Last 24 Hrs  T(C): 36.9 (30 Mar 2025 05:29), Max: 37.7 (29 Mar 2025 17:08)  T(F): 98.4 (30 Mar 2025 05:29), Max: 99.8 (29 Mar 2025 17:08)  HR: 99 (30 Mar 2025 05:29) (99 - 108)  BP: 114/63 (30 Mar 2025 05:29) (108/61 - 131/72)  BP(mean): --  RR: 17 (30 Mar 2025 05:29) (17 - 18)  SpO2: 98% (30 Mar 2025 05:29) (88% - 100%)    Parameters below as of 30 Mar 2025 05:29  Patient On (Oxygen Delivery Method): room air        Physical Exam:  General: NAD; resting comfrotably in bed  Resp: non labored  Spine:  Dressing clean/dry/intact  Drain: HV/MARSHAL serosanguinous with good suction    Motor:              C5(Del/Bi)         C6(EF/WE)           C7 (EE/WF)           C8 (FDS)           T1 (FAbd)  R            5/5                    5/5                        5/5                           5/5                   5/5  L             5/5                    5/5                        5/5                            5/5                  5/5                L2 (IP)            L3 (Quad)            L4 (TA)               L5 (EHL)            S1(Gas)        S2(FHL)  R           5/5                       5/5                 5/5                       5/5                    5/5                5/5  L           5/5                       5/5                 5/5                       5/5                    5/5                5/5    Sensory:            C5         C6         C7      C8       T1        (0=absent, 1=impaired, 2=normal, NT=not testable)  R         2            2           2        2         2  L          2            2           2        2         2               L2          L3         L4      L5       S1         (0=absent, 1=impaired, 2=normal, NT=not testable)  R         2            2            2        2        2  L          2            2           2        2         2        LABS                        11.2   8.91  )-----------( 248      ( 30 Mar 2025 07:14 )             34.9       03-30    138  |  101  |  10  ----------------------------<  106[H]  4.1   |  28  |  0.59    Ca    8.8      30 Mar 2025 07:14            I&O's Summary    29 Mar 2025 07:01  -  30 Mar 2025 07:00  --------------------------------------------------------  IN: 0 mL / OUT: 805 mL / NET: -805 mL

## 2025-03-31 LAB
ADD ON TEST-SPECIMEN IN LAB: SIGNIFICANT CHANGE UP
ANION GAP SERPL CALC-SCNC: 14 MMOL/L — SIGNIFICANT CHANGE UP (ref 7–14)
BUN SERPL-MCNC: 9 MG/DL — SIGNIFICANT CHANGE UP (ref 7–23)
CALCIUM SERPL-MCNC: 9 MG/DL — SIGNIFICANT CHANGE UP (ref 8.4–10.5)
CHLORIDE SERPL-SCNC: 99 MMOL/L — SIGNIFICANT CHANGE UP (ref 98–107)
CO2 SERPL-SCNC: 27 MMOL/L — SIGNIFICANT CHANGE UP (ref 22–31)
CREAT SERPL-MCNC: 0.61 MG/DL — SIGNIFICANT CHANGE UP (ref 0.5–1.3)
EGFR: 100 ML/MIN/1.73M2 — SIGNIFICANT CHANGE UP
EGFR: 100 ML/MIN/1.73M2 — SIGNIFICANT CHANGE UP
GLUCOSE BLDC GLUCOMTR-MCNC: 101 MG/DL — HIGH (ref 70–99)
GLUCOSE BLDC GLUCOMTR-MCNC: 104 MG/DL — HIGH (ref 70–99)
GLUCOSE BLDC GLUCOMTR-MCNC: 106 MG/DL — HIGH (ref 70–99)
GLUCOSE BLDC GLUCOMTR-MCNC: 90 MG/DL — SIGNIFICANT CHANGE UP (ref 70–99)
GLUCOSE SERPL-MCNC: 102 MG/DL — HIGH (ref 70–99)
HCT VFR BLD CALC: 36 % — SIGNIFICANT CHANGE UP (ref 34.5–45)
HGB BLD-MCNC: 11.3 G/DL — LOW (ref 11.5–15.5)
MCHC RBC-ENTMCNC: 27.8 PG — SIGNIFICANT CHANGE UP (ref 27–34)
MCHC RBC-ENTMCNC: 31.4 G/DL — LOW (ref 32–36)
MCV RBC AUTO: 88.7 FL — SIGNIFICANT CHANGE UP (ref 80–100)
NRBC # BLD AUTO: 0 K/UL — SIGNIFICANT CHANGE UP (ref 0–0)
NRBC # FLD: 0 K/UL — SIGNIFICANT CHANGE UP (ref 0–0)
NRBC BLD AUTO-RTO: 0 /100 WBCS — SIGNIFICANT CHANGE UP (ref 0–0)
PLATELET # BLD AUTO: 262 K/UL — SIGNIFICANT CHANGE UP (ref 150–400)
POTASSIUM SERPL-MCNC: 3.7 MMOL/L — SIGNIFICANT CHANGE UP (ref 3.5–5.3)
POTASSIUM SERPL-SCNC: 3.7 MMOL/L — SIGNIFICANT CHANGE UP (ref 3.5–5.3)
RBC # BLD: 4.06 M/UL — SIGNIFICANT CHANGE UP (ref 3.8–5.2)
RBC # FLD: 14 % — SIGNIFICANT CHANGE UP (ref 10.3–14.5)
SODIUM SERPL-SCNC: 140 MMOL/L — SIGNIFICANT CHANGE UP (ref 135–145)
TSH SERPL-MCNC: 3.3 UIU/ML — SIGNIFICANT CHANGE UP (ref 0.27–4.2)
WBC # BLD: 8.71 K/UL — SIGNIFICANT CHANGE UP (ref 3.8–10.5)
WBC # FLD AUTO: 8.71 K/UL — SIGNIFICANT CHANGE UP (ref 3.8–10.5)

## 2025-03-31 PROCEDURE — 93970 EXTREMITY STUDY: CPT | Mod: 26

## 2025-03-31 RX ORDER — ACETAMINOPHEN 500 MG/5ML
1000 LIQUID (ML) ORAL ONCE
Refills: 0 | Status: COMPLETED | OUTPATIENT
Start: 2025-03-31 | End: 2025-03-31

## 2025-03-31 RX ADMIN — Medication 0.25 MILLIGRAM(S): at 13:15

## 2025-03-31 RX ADMIN — Medication 4 MILLIGRAM(S): at 11:06

## 2025-03-31 RX ADMIN — POLYETHYLENE GLYCOL 3350 17 GRAM(S): 17 POWDER, FOR SOLUTION ORAL at 13:10

## 2025-03-31 RX ADMIN — CYCLOBENZAPRINE HYDROCHLORIDE 10 MILLIGRAM(S): 15 CAPSULE, EXTENDED RELEASE ORAL at 05:13

## 2025-03-31 RX ADMIN — Medication 4 MILLIGRAM(S): at 20:45

## 2025-03-31 RX ADMIN — Medication 4 MILLIGRAM(S): at 21:40

## 2025-03-31 RX ADMIN — Medication 4 MILLIGRAM(S): at 05:13

## 2025-03-31 RX ADMIN — Medication 0.25 MILLIGRAM(S): at 08:00

## 2025-03-31 RX ADMIN — Medication 4 MILLIGRAM(S): at 16:00

## 2025-03-31 RX ADMIN — Medication 0.25 MILLIGRAM(S): at 12:52

## 2025-03-31 RX ADMIN — Medication 4 MILLIGRAM(S): at 10:28

## 2025-03-31 RX ADMIN — CYCLOBENZAPRINE HYDROCHLORIDE 10 MILLIGRAM(S): 15 CAPSULE, EXTENDED RELEASE ORAL at 21:54

## 2025-03-31 RX ADMIN — Medication 0.25 MILLIGRAM(S): at 07:33

## 2025-03-31 RX ADMIN — AMLODIPINE BESYLATE 5 MILLIGRAM(S): 10 TABLET ORAL at 05:13

## 2025-03-31 RX ADMIN — Medication 40 MILLIGRAM(S): at 05:12

## 2025-03-31 RX ADMIN — Medication 4 MILLIGRAM(S): at 16:44

## 2025-03-31 RX ADMIN — Medication 400 MILLIGRAM(S): at 08:56

## 2025-03-31 RX ADMIN — GABAPENTIN 100 MILLIGRAM(S): 400 CAPSULE ORAL at 13:10

## 2025-03-31 RX ADMIN — Medication 1000 MILLIGRAM(S): at 10:36

## 2025-03-31 RX ADMIN — Medication 4 MILLIGRAM(S): at 06:13

## 2025-03-31 RX ADMIN — GABAPENTIN 100 MILLIGRAM(S): 400 CAPSULE ORAL at 21:53

## 2025-03-31 RX ADMIN — ATORVASTATIN CALCIUM 20 MILLIGRAM(S): 80 TABLET, FILM COATED ORAL at 21:54

## 2025-03-31 RX ADMIN — CYCLOBENZAPRINE HYDROCHLORIDE 10 MILLIGRAM(S): 15 CAPSULE, EXTENDED RELEASE ORAL at 13:10

## 2025-03-31 NOTE — PROVIDER CONTACT NOTE (OTHER) - ACTION/TREATMENT ORDERED:
IV tylenol will be ordered. See if pt can tolerate senna, gabapentin and lipitor the next time that they are due.
continue to monitor and notify again if pt goes above 120. continue  for now
2L O2 via NC

## 2025-03-31 NOTE — PROGRESS NOTE ADULT - SUBJECTIVE AND OBJECTIVE BOX
Sleepy but readily arousable  Some cervicalgia  No HA or acute visual changes    Vital Signs Last 24 Hrs  T(C): 36.8 (31 Mar 2025 09:07), Max: 37.1 (30 Mar 2025 17:18)  T(F): 98.2 (31 Mar 2025 09:07), Max: 98.7 (30 Mar 2025 17:18)  HR: 99 (31 Mar 2025 09:07) (95 - 107)  BP: 120/62 (31 Mar 2025 09:07) (105/60 - 140/57)  BP(mean): --  RR: 17 (31 Mar 2025 09:07) (16 - 18)  SpO2: 97% (31 Mar 2025 09:07) (97% - 100%)    I&O's Summary    03-30-25 @ 07:01  -  03-31-25 @ 07:00  --------------------------------------------------------  IN: 0 mL / OUT: 700 mL / NET: -700 mL    03-31-25 @ 07:01  -  03-31-25 @ 09:31  --------------------------------------------------------  IN: 0 mL / OUT: 300 mL / NET: -300 mL        Gen: NAD  Head: NCAT, EOMI  Lungs: CTA b/l  Heart: RRR, nl S1/S2, no murmurs  Abd: soft, NTND, NABS  Neuro: A+O x 3      LABS:                        11.3   8.71  )-----------( 262      ( 31 Mar 2025 06:04 )             36.0     03-31    140  |  99  |  9   ----------------------------<  102[H]  3.7   |  27  |  0.61    Ca    9.0      31 Mar 2025 06:04        CAPILLARY BLOOD GLUCOSE      POCT Blood Glucose.: 90 mg/dL (31 Mar 2025 07:20)  POCT Blood Glucose.: 102 mg/dL (30 Mar 2025 21:10)  POCT Blood Glucose.: 127 mg/dL (30 Mar 2025 16:32)  POCT Blood Glucose.: 102 mg/dL (30 Mar 2025 11:38)        Urinalysis Basic - ( 31 Mar 2025 06:04 )    Color: x / Appearance: x / SG: x / pH: x  Gluc: 102 mg/dL / Ketone: x  / Bili: x / Urobili: x   Blood: x / Protein: x / Nitrite: x   Leuk Esterase: x / RBC: x / WBC x   Sq Epi: x / Non Sq Epi: x / Bacteria: x        RADIOLOGY & ADDITIONAL TESTS:    Imaging Personally Reviewed:  [x] YES  [ ] NO    Case discussed with NPP:  [X] YES  [ ] NO   Sleepy but readily arousable  Some cervicalgia  No HA or acute visual changes  She denies any chest pain or SOB    Vital Signs Last 24 Hrs  T(C): 36.8 (31 Mar 2025 09:07), Max: 37.1 (30 Mar 2025 17:18)  T(F): 98.2 (31 Mar 2025 09:07), Max: 98.7 (30 Mar 2025 17:18)  HR: 99 (31 Mar 2025 09:07) (95 - 107)  BP: 120/62 (31 Mar 2025 09:07) (105/60 - 140/57)  BP(mean): --  RR: 17 (31 Mar 2025 09:07) (16 - 18)  SpO2: 97% (31 Mar 2025 09:07) (97% - 100%)    I&O's Summary    03-30-25 @ 07:01  -  03-31-25 @ 07:00  --------------------------------------------------------  IN: 0 mL / OUT: 700 mL / NET: -700 mL    03-31-25 @ 07:01  -  03-31-25 @ 09:31  --------------------------------------------------------  IN: 0 mL / OUT: 300 mL / NET: -300 mL        Gen: NAD  Head: NCAT, EOMI  Lungs: CTA b/l  Heart: RR, mild tachycardia, nl S1/S2, no murmurs  Abd: soft, NTND, NABS  Neuro: A+O x 3      LABS:                        11.3   8.71  )-----------( 262      ( 31 Mar 2025 06:04 )             36.0     03-31    140  |  99  |  9   ----------------------------<  102[H]  3.7   |  27  |  0.61    Ca    9.0      31 Mar 2025 06:04        CAPILLARY BLOOD GLUCOSE      POCT Blood Glucose.: 90 mg/dL (31 Mar 2025 07:20)  POCT Blood Glucose.: 102 mg/dL (30 Mar 2025 21:10)  POCT Blood Glucose.: 127 mg/dL (30 Mar 2025 16:32)  POCT Blood Glucose.: 102 mg/dL (30 Mar 2025 11:38)        Urinalysis Basic - ( 31 Mar 2025 06:04 )    Color: x / Appearance: x / SG: x / pH: x  Gluc: 102 mg/dL / Ketone: x  / Bili: x / Urobili: x   Blood: x / Protein: x / Nitrite: x   Leuk Esterase: x / RBC: x / WBC x   Sq Epi: x / Non Sq Epi: x / Bacteria: x        RADIOLOGY & ADDITIONAL TESTS:    Imaging Personally Reviewed:  [x] YES  [ ] NO    Case discussed with NPP:  [X] YES  [ ] NO   Readily arousable  Some residual cervicalgia  No HA or acute visual changes  She denies any chest pain, dizziness, palpitations or SOB    Vital Signs Last 24 Hrs  T(C): 36.8 (31 Mar 2025 09:07), Max: 37.1 (30 Mar 2025 17:18)  T(F): 98.2 (31 Mar 2025 09:07), Max: 98.7 (30 Mar 2025 17:18)  HR: 99 (31 Mar 2025 09:07) (95 - 107)  BP: 120/62 (31 Mar 2025 09:07) (105/60 - 140/57)  BP(mean): --  RR: 17 (31 Mar 2025 09:07) (16 - 18)  SpO2: 97% (31 Mar 2025 09:07) (97% - 100%)    I&O's Summary    03-30-25 @ 07:01  -  03-31-25 @ 07:00  --------------------------------------------------------  IN: 0 mL / OUT: 700 mL / NET: -700 mL    03-31-25 @ 07:01  -  03-31-25 @ 09:31  --------------------------------------------------------  IN: 0 mL / OUT: 300 mL / NET: -300 mL        Gen: NAD  Head: NCAT, EOMI  Lungs: CTA b/l  Heart: RR, mild tachycardia, nl S1/S2, no murmurs  Abd: soft, NTND, NABS  Neuro: A+O x 3      LABS:                        11.3   8.71  )-----------( 262      ( 31 Mar 2025 06:04 )             36.0     03-31    140  |  99  |  9   ----------------------------<  102[H]  3.7   |  27  |  0.61    Ca    9.0      31 Mar 2025 06:04        CAPILLARY BLOOD GLUCOSE      POCT Blood Glucose.: 90 mg/dL (31 Mar 2025 07:20)  POCT Blood Glucose.: 102 mg/dL (30 Mar 2025 21:10)  POCT Blood Glucose.: 127 mg/dL (30 Mar 2025 16:32)  POCT Blood Glucose.: 102 mg/dL (30 Mar 2025 11:38)        Urinalysis Basic - ( 31 Mar 2025 06:04 )    Color: x / Appearance: x / SG: x / pH: x  Gluc: 102 mg/dL / Ketone: x  / Bili: x / Urobili: x   Blood: x / Protein: x / Nitrite: x   Leuk Esterase: x / RBC: x / WBC x   Sq Epi: x / Non Sq Epi: x / Bacteria: x        RADIOLOGY & ADDITIONAL TESTS:    Imaging Personally Reviewed:  [x] YES  [ ] NO    Case discussed with NPP:  [X] YES  [ ] NO

## 2025-03-31 NOTE — PROGRESS NOTE ADULT - ASSESSMENT
64y y/o Female s/p C3-7 PSF on 3/27/25     PLAN  -    WBAT  -    DVT ppx: Venodynes, ambulation   -    Resumed home meds  -    MARSHAL DC'd 3/30  -    WBAT   -    PT/OT  -    Dispo:  Home 3/31      Estela Breen MD, PGY-2  Orthopaedic Surgery  [facility] [place] [id number]  [facility]        [id number]  [facility]  [id number]/ [phone] 64y y/o Female s/p C3-7 PSF on 3/27/25     PLAN  -    WBAT  -    DVT ppx: Venodynes, ambulation   -    Resumed home meds  -    MARSHAL DC'd 3/30  -    WBAT   -    PT/OT  -    f/u BLE dopplers   -    Dispo:  Home 3/31      Estela Breen MD, PGY-2  Orthopaedic Surgery  [facility] [place] [id number]  [facility]        [id number]  [facility]  [id number]/ [phone]

## 2025-03-31 NOTE — PROGRESS NOTE ADULT - ASSESSMENT
64-yo female w/PMHx of DM2, colonic polyps, HTN, HLD, MVP, and obesity who presents with neck, upper and lower back pain for many yrs. that radiates to scapular and LUE; she is s/p decompression of cervical spine with fusion on 3/27/25      Cervicalgia 2' spinal cord compression  - s/p decompression of cervical spine with fusion on 3/27/25  - MARSHAL removed 3/30/25  - on dilaudid IVP/oral prn  - bowel regimen  - incentive spirometer  - OOB as tolerated  - TOV 3/28/25 --> passed  - PT/OT underway --> outpt PT + no skilled OT needs  - appreciate orthopedic intervention and post-op care    DM2 w/unspecified complications  - HgbA1c = 6.2  - fingersticks are acceptable under ISS  - to resume ozempic as outpt    HTN  - BP is currently controlled  - cont amlodipine    HLD  - cont atorvastatin in place of lovastatin    Disposition  - likely home 3/31/25  - PT/OT underway --> outpt PT + no skilled OT needs   64-yo female w/PMHx of DM2, colonic polyps, HTN, HLD, MVP, and obesity who presents with neck, upper and lower back pain for many yrs. that radiates to scapular and LUE; she is s/p decompression of cervical spine with fusion on 3/27/25      Cervicalgia 2' spinal cord compression  - s/p decompression of cervical spine with fusion on 3/27/25  - MARSHAL removed 3/30/25  - on dilaudid IVP/oral prn  - bowel regimen  - incentive spirometer  - OOB as tolerated  - TOV 3/28/25 --> passed  - PT/OT underway --> outpt PT + no skilled OT needs  - appreciate orthopedic intervention and post-op care    Mild tachycardia  - noted HR low 100s intermittently during this admission  - pt reiterated to me that she has a relatively higher baseline HR in the outpt setting, ?body habitus?  - she also has some residual cervicalgia which is likely contributing to sympathetic stimulation  - my suspicious for PE is very low given her nl LE venous dopplers coupled with her lack of any palpitations/dizziness/chest pain/SOB   - from my viewpoint, pt may be discharged and followup with her PCP within one week after discharge  - I counseled her to return immediately to the closest ER if she does experience any new-onset pulmonary/cardiac or any other new concerning symptoms/signs.    DM2 w/unspecified complications  - HgbA1c = 6.2  - fingersticks are acceptable under ISS  - to resume ozempic as outpt    HTN  - BP is currently controlled  - cont amlodipine    HLD  - cont atorvastatin in place of lovastatin    Disposition  - likely home 3/31/25  - PT/OT underway --> outpt PT + no skilled OT needs   64-yo female w/PMHx of DM2, colonic polyps, HTN, HLD, MVP, and obesity who presents with neck, upper and lower back pain for many yrs. that radiates to scapular and LUE; she is s/p decompression of cervical spine with fusion on 3/27/25      Cervicalgia 2' spinal cord compression  - s/p decompression of cervical spine with fusion on 3/27/25  - MARSHAL removed 3/30/25  - on dilaudid IVP/oral prn  - bowel regimen  - incentive spirometer  - OOB as tolerated  - TOV 3/28/25 --> passed  - PT/OT underway --> outpt PT + no skilled OT needs  - appreciate orthopedic intervention and post-op care    Mild tachycardia  - noted HR low 100s intermittently during this admission  - pt reiterated to me that she has a relatively higher baseline HR in the outpt setting, ?body habitus?  - she also has some residual cervicalgia which is likely contributing to sympathetic stimulation  - my suspicious for PE is very low given her nl LE venous dopplers coupled with her lack of any palpitations/dizziness/chest pain/SOB   - from my viewpoint, pt may be discharged and followup with her PCP within one week after discharge  - I counseled her to return immediately to the closest ER if she does experience any new-onset pulmonary/cardiac symptoms or any other new concerning symptoms/signs.    DM2 w/unspecified complications  - HgbA1c = 6.2  - fingersticks are acceptable under ISS  - to resume ozempic as outpt    HTN  - BP is currently controlled  - cont amlodipine    HLD  - cont atorvastatin in place of lovastatin    Disposition  - likely home 3/31/25  - PT/OT underway --> outpt PT + no skilled OT needs   64-yo female w/PMHx of DM2, colonic polyps, HTN, HLD, MVP, and obesity who presents with neck, upper and lower back pain for many yrs. that radiates to scapular and LUE; she is s/p decompression of cervical spine with fusion on 3/27/25      Cervicalgia 2' spinal cord compression  - s/p decompression of cervical spine with fusion on 3/27/25  - MARSHAL removed 3/30/25  - on dilaudid IVP/oral prn  - bowel regimen  - incentive spirometer  - OOB as tolerated  - TOV 3/28/25 --> passed  - PT/OT underway --> outpt PT + no skilled OT needs  - appreciate orthopedic intervention and post-op care    Mild tachycardia  - noted HR low 100s intermittently during this admission  - reviewed her two EKGs from 3/29/25 in her physical chart; nl axis, no ST/T wave changes, no evidence for bundle branch block, no Q waves  - pt reiterated to me that she has a relatively higher baseline HR in the outpt setting, ?body habitus?  - she also has some residual cervicalgia which is likely contributing to sympathetic stimulation  - my suspicious for PE is very low given her nl LE venous dopplers coupled with her lack of any palpitations/dizziness/chest pain/SOB   - from my viewpoint, pt may be discharged and followup with her PCP within one week after discharge  - I counseled her to return immediately to the closest ER if she does experience any new-onset pulmonary/cardiac symptoms or any other new concerning symptoms/signs.    DM2 w/unspecified complications  - HgbA1c = 6.2  - fingersticks are acceptable under ISS  - to resume ozempic as outpt    HTN  - BP is currently controlled  - cont amlodipine    HLD  - cont atorvastatin in place of lovastatin    Disposition  - likely home 3/31/25  - PT/OT underway --> outpt PT + no skilled OT needs

## 2025-03-31 NOTE — PROGRESS NOTE ADULT - SUBJECTIVE AND OBJECTIVE BOX
SUBJECTIVE  Patient seen and examined at bedside. No acute events overnight. Endorsing posterior neck pain. Denies fevers/chills, chest pain, or dyspnea.     OBJECTIVE  Vital Signs Last 24 Hrs  T(C): 37 (31 Mar 2025 01:35), Max: 37.1 (30 Mar 2025 17:18)  T(F): 98.6 (31 Mar 2025 01:35), Max: 98.7 (30 Mar 2025 17:18)  HR: 95 (31 Mar 2025 01:35) (95 - 107)  BP: 105/60 (31 Mar 2025 01:35) (105/60 - 140/57)  BP(mean): --  RR: 16 (31 Mar 2025 01:35) (16 - 18)  SpO2: 100% (31 Mar 2025 01:35) (98% - 100%)    Parameters below as of 31 Mar 2025 01:35  Patient On (Oxygen Delivery Method): nasal cannula  O2 Flow (L/min): 2      PHYSICAL EXAM  Gen: Lying in bed, NAD  Resp: No increased WOB  Spine:  C-collar in place  Dressing c/d/i    Motor:                   C5                C6              C7               C8           T1   R            5/5                5/5            5/5             5/5          5/5  L             5/5               5/5             5/5             5/5          5/5                L2             L3             L4               L5            S1  R         5/5           5/5          5/5             5/5           5/5  L          5/5          5/5           5/5             5/5           5/5    Sensory:            C5         C6         C7      C8       T1        (0=absent, 1=impaired, 2=normal, NT=not testable)  R         2            2           2        2         2  L          2            2           2        2         2               L2          L3         L4      L5       S1         (0=absent, 1=impaired, 2=normal, NT=not testable)  R         2            2            2        2        2  L          2            2           2        2         2    LEs:  Calves soft, no TTP b/l  WWP b/l    LABS                        11.2   8.91  )-----------( 248      ( 30 Mar 2025 07:14 )             34.9     03-30    138  |  101  |  10  ----------------------------<  106[H]  4.1   |  28  |  0.59    Ca    8.8      30 Mar 2025 07:14

## 2025-03-31 NOTE — PROVIDER CONTACT NOTE (OTHER) - SITUATION
Pt desatting to 86 % on room air
Pt vomited while administering 2200 meds crushed in apple sauce. Pt refused to take any more of the medication.
Pt has been tachy over the last two shifts. ranging from 100-115. pt on . day nurse said pts ox was below 90 on RA, she put her on 2L nc. ekg was done over day shift. ox is at 100 on NC.

## 2025-03-31 NOTE — PROVIDER CONTACT NOTE (OTHER) - BACKGROUND
Pt admitted 3/27 s/p C3-C7 PSF
Pt stated she cannot take pills and needs them crushed. Attempted to administer crushed tylenol, lipitor, senna and gabapentin (med poured out from capsule as per pharmacy) in apple sauce.
pt had c3-c7 fusion on 3/27.

## 2025-03-31 NOTE — PROVIDER CONTACT NOTE (OTHER) - RECOMMENDATIONS
provider said she will speak to rest of team and decide what tests to run in the morning. for now maintain 
2L O2 via NC
If medication is available in IV form switch to that route.

## 2025-03-31 NOTE — PROVIDER CONTACT NOTE (OTHER) - ASSESSMENT
Abdomen soft and non tender. Bowel sounds present and normoactive. Pt stated she no longer felt nauseous.
Pt has been tachy over the last two shifts. ranging from 100-115. pt on . day nurse said pts ox was below 90 on RA, she put her on 2L nc. ekg was done over day shift. ox is at 100 on NC.
Pt in bed. A&Ox4, denies SOB or chest pain at this time. All other VSS.

## 2025-04-01 LAB
GLUCOSE BLDC GLUCOMTR-MCNC: 104 MG/DL — HIGH (ref 70–99)
GLUCOSE BLDC GLUCOMTR-MCNC: 113 MG/DL — HIGH (ref 70–99)
GLUCOSE BLDC GLUCOMTR-MCNC: 88 MG/DL — SIGNIFICANT CHANGE UP (ref 70–99)
GLUCOSE BLDC GLUCOMTR-MCNC: 96 MG/DL — SIGNIFICANT CHANGE UP (ref 70–99)

## 2025-04-01 RX ADMIN — CYCLOBENZAPRINE HYDROCHLORIDE 10 MILLIGRAM(S): 15 CAPSULE, EXTENDED RELEASE ORAL at 09:17

## 2025-04-01 RX ADMIN — ATORVASTATIN CALCIUM 20 MILLIGRAM(S): 80 TABLET, FILM COATED ORAL at 22:11

## 2025-04-01 RX ADMIN — Medication 4 MILLIGRAM(S): at 02:01

## 2025-04-01 RX ADMIN — GABAPENTIN 100 MILLIGRAM(S): 400 CAPSULE ORAL at 09:20

## 2025-04-01 RX ADMIN — Medication 4 MILLIGRAM(S): at 17:00

## 2025-04-01 RX ADMIN — AMLODIPINE BESYLATE 5 MILLIGRAM(S): 10 TABLET ORAL at 06:57

## 2025-04-01 RX ADMIN — CYCLOBENZAPRINE HYDROCHLORIDE 10 MILLIGRAM(S): 15 CAPSULE, EXTENDED RELEASE ORAL at 22:10

## 2025-04-01 RX ADMIN — Medication 40 MILLIGRAM(S): at 06:57

## 2025-04-01 RX ADMIN — CYCLOBENZAPRINE HYDROCHLORIDE 10 MILLIGRAM(S): 15 CAPSULE, EXTENDED RELEASE ORAL at 17:04

## 2025-04-01 RX ADMIN — Medication 4 MILLIGRAM(S): at 16:14

## 2025-04-01 RX ADMIN — Medication 4 MILLIGRAM(S): at 06:55

## 2025-04-01 RX ADMIN — Medication 4 MILLIGRAM(S): at 03:00

## 2025-04-01 NOTE — PROGRESS NOTE ADULT - ASSESSMENT
64y y/o Female s/p C3-7 PSF on 3/27/25     PLAN  -    WBAT  -    DVT ppx: Venodynes, ambulation   -    Resumed home meds  -    MARSHAL DC'd 3/30  -    WBAT   -    PT/OT  -    BLE dopplers negative  -    Dispo:  Home       Estela Breen MD, PGY-2  Orthopaedic Surgery  [facility] [place] [id number]  [facility]        [id number]  [facility]  [id number]/ [phone]

## 2025-04-01 NOTE — PROGRESS NOTE ADULT - ASSESSMENT
64-yo female w/PMHx of DM2, colonic polyps, HTN, HLD, MVP, and obesity who presents with neck, upper and lower back pain for many yrs. that radiates to scapular and LUE; she is s/p decompression of cervical spine with fusion on 3/27/25      Cervicalgia 2' spinal cord compression  - s/p decompression of cervical spine with fusion on 3/27/25  - MARSHAL removed 3/30/25  - OOB as tolerated  - TOV 3/28/25 --> passed  - naproxen upon discharge  - PT/OT underway --> outpt PT + no skilled OT needs  - appreciate orthopedic intervention and post-op care    Mild tachycardia  - noted HR low 100s intermittently during this admission  - reviewed her two EKGs from 3/29/25 in her physical chart; nl axis, no ST/T wave changes, no evidence for bundle branch block, no Q waves  - pt reiterated to me that she has a relatively higher baseline HR in the outpt setting, ?body habitus?  - my suspicious for PE is very low given her nl LE venous dopplers coupled with her lack of any palpitations/dizziness/chest pain/SOB   - from my viewpoint, pt may be discharged and followup with her PCP within one-to-two weeks after discharge  - I counseled her to return immediately to the closest ER if she does experience any new-onset pulmonary/cardiac symptoms or any other new concerning symptoms/signs.    DM2 w/unspecified complications  - HgbA1c = 6.2  - fingersticks are acceptable under ISS  - to resume ozempic as outpt    HTN  - BP is currently controlled  - cont amlodipine    HLD  - cont atorvastatin in place of lovastatin    Disposition  - home 4/1/25  - PT/OT --> outpt PT + no skilled OT needs

## 2025-04-01 NOTE — PROGRESS NOTE ADULT - SUBJECTIVE AND OBJECTIVE BOX
SUBJECTIVE  Patient seen and examined at bedside. No acute events overnight. States pain is controlled. Denies fevers/chills, chest pain, or dyspnea.    OBJECTIVE  Vital Signs Last 24 Hrs  T(C): 37 (01 Apr 2025 01:38), Max: 37.4 (31 Mar 2025 21:56)  T(F): 98.6 (01 Apr 2025 01:38), Max: 99.4 (31 Mar 2025 21:56)  HR: 106 (01 Apr 2025 01:38) (99 - 108)  BP: 111/74 (01 Apr 2025 01:38) (109/59 - 122/69)  BP(mean): --  RR: 18 (01 Apr 2025 01:38) (16 - 18)  SpO2: 100% (01 Apr 2025 01:38) (95% - 100%)    Parameters below as of 01 Apr 2025 01:38  Patient On (Oxygen Delivery Method): room air        PHYSICAL EXAM  Gen: Lying in bed, NAD  Resp: No increased WOB  Spine:  Dressing c/d/i    Motor:                   C5                C6              C7               C8           T1   R            5/5                5/5            5/5             5/5          5/5  L             5/5               5/5             5/5             5/5          5/5                L2             L3             L4               L5            S1  R         5/5           5/5          5/5             5/5           5/5  L          5/5          5/5           5/5             5/5           5/5    Sensory:            C5         C6         C7      C8       T1        (0=absent, 1=impaired, 2=normal, NT=not testable)  R         2            2           2        2         2  L          2            2           2        2         2               L2          L3         L4      L5       S1         (0=absent, 1=impaired, 2=normal, NT=not testable)  R         2            2            2        2        2  L          2            2           2        2         2    LEs:  Calves soft, no TTP b/l  WWP b/l    LABS                        11.3   8.71  )-----------( 262      ( 31 Mar 2025 06:04 )             36.0     03-31    140  |  99  |  9   ----------------------------<  102[H]  3.7   |  27  |  0.61    Ca    9.0      31 Mar 2025 06:04

## 2025-04-01 NOTE — PROGRESS NOTE ADULT - SUBJECTIVE AND OBJECTIVE BOX
No new symptoms overnight    Vital Signs Last 24 Hrs  T(C): 36.7 (01 Apr 2025 09:16), Max: 37.4 (31 Mar 2025 21:56)  T(F): 98 (01 Apr 2025 09:16), Max: 99.4 (31 Mar 2025 21:56)  HR: 110 (01 Apr 2025 06:30) (100 - 110)  BP: 113/71 (01 Apr 2025 09:16) (109/59 - 129/74)  BP(mean): --  RR: 18 (01 Apr 2025 09:16) (17 - 18)  SpO2: 100% (01 Apr 2025 09:16) (95% - 100%)    I&O's Summary    03-31-25 @ 07:01  -  04-01-25 @ 07:00  --------------------------------------------------------  IN: 0 mL / OUT: 1100 mL / NET: -1100 mL          Gen: NAD  Head: NCAT, EOMI  Lungs: CTA b/l  Heart: RRR, nl S1/S2, no murmurs  Abd: soft, NTND, NABS  Neuro: A+O x 3      LABS:                        11.3   8.71  )-----------( 262      ( 31 Mar 2025 06:04 )             36.0     03-31    140  |  99  |  9   ----------------------------<  102[H]  3.7   |  27  |  0.61    Ca    9.0      31 Mar 2025 06:04        CAPILLARY BLOOD GLUCOSE      POCT Blood Glucose.: 88 mg/dL (01 Apr 2025 06:57)  POCT Blood Glucose.: 101 mg/dL (31 Mar 2025 22:25)  POCT Blood Glucose.: 106 mg/dL (31 Mar 2025 16:30)  POCT Blood Glucose.: 104 mg/dL (31 Mar 2025 11:33)        Urinalysis Basic - ( 31 Mar 2025 06:04 )    Color: x / Appearance: x / SG: x / pH: x  Gluc: 102 mg/dL / Ketone: x  / Bili: x / Urobili: x   Blood: x / Protein: x / Nitrite: x   Leuk Esterase: x / RBC: x / WBC x   Sq Epi: x / Non Sq Epi: x / Bacteria: x        RADIOLOGY & ADDITIONAL TESTS:    Imaging Personally Reviewed:  [x] YES  [ ] NO    Case discussed with NPP:  [X] YES  [ ] NO

## 2025-04-02 LAB
GLUCOSE BLDC GLUCOMTR-MCNC: 103 MG/DL — HIGH (ref 70–99)
GLUCOSE BLDC GLUCOMTR-MCNC: 107 MG/DL — HIGH (ref 70–99)
GLUCOSE BLDC GLUCOMTR-MCNC: 113 MG/DL — HIGH (ref 70–99)
GLUCOSE BLDC GLUCOMTR-MCNC: 88 MG/DL — SIGNIFICANT CHANGE UP (ref 70–99)

## 2025-04-02 RX ADMIN — GABAPENTIN 100 MILLIGRAM(S): 400 CAPSULE ORAL at 14:39

## 2025-04-02 RX ADMIN — Medication 4 MILLIGRAM(S): at 14:36

## 2025-04-02 RX ADMIN — Medication 4 MILLIGRAM(S): at 23:54

## 2025-04-02 RX ADMIN — AMLODIPINE BESYLATE 5 MILLIGRAM(S): 10 TABLET ORAL at 05:22

## 2025-04-02 RX ADMIN — CYCLOBENZAPRINE HYDROCHLORIDE 10 MILLIGRAM(S): 15 CAPSULE, EXTENDED RELEASE ORAL at 21:14

## 2025-04-02 RX ADMIN — CYCLOBENZAPRINE HYDROCHLORIDE 10 MILLIGRAM(S): 15 CAPSULE, EXTENDED RELEASE ORAL at 14:39

## 2025-04-02 RX ADMIN — ATORVASTATIN CALCIUM 20 MILLIGRAM(S): 80 TABLET, FILM COATED ORAL at 21:14

## 2025-04-02 RX ADMIN — GABAPENTIN 100 MILLIGRAM(S): 400 CAPSULE ORAL at 21:13

## 2025-04-02 RX ADMIN — Medication 4 MILLIGRAM(S): at 08:53

## 2025-04-02 RX ADMIN — Medication 4 MILLIGRAM(S): at 09:53

## 2025-04-02 RX ADMIN — Medication 40 MILLIGRAM(S): at 05:22

## 2025-04-02 RX ADMIN — Medication 4 MILLIGRAM(S): at 00:57

## 2025-04-02 RX ADMIN — Medication 4 MILLIGRAM(S): at 18:41

## 2025-04-02 RX ADMIN — GABAPENTIN 100 MILLIGRAM(S): 400 CAPSULE ORAL at 05:22

## 2025-04-02 RX ADMIN — CYCLOBENZAPRINE HYDROCHLORIDE 10 MILLIGRAM(S): 15 CAPSULE, EXTENDED RELEASE ORAL at 05:22

## 2025-04-02 RX ADMIN — Medication 4 MILLIGRAM(S): at 01:00

## 2025-04-02 RX ADMIN — Medication 4 MILLIGRAM(S): at 19:41

## 2025-04-02 RX ADMIN — Medication 4 MILLIGRAM(S): at 22:54

## 2025-04-02 NOTE — PROGRESS NOTE ADULT - SUBJECTIVE AND OBJECTIVE BOX
SUBJECTIVE  Patient seen and examined at bedside. No acute events overnight. Reports posterior neck pain. Denies fevers/chills, chest pain, or dyspnea.    OBJECTIVE  Vital Signs Last 24 Hrs  T(C): 36.6 (02 Apr 2025 04:55), Max: 37.3 (01 Apr 2025 06:30)  T(F): 97.8 (02 Apr 2025 04:55), Max: 99.2 (01 Apr 2025 06:30)  HR: 100 (02 Apr 2025 04:55) (100 - 110)  BP: 114/74 (02 Apr 2025 04:55) (113/71 - 140/75)  BP(mean): --  RR: 18 (02 Apr 2025 04:55) (17 - 18)  SpO2: 100% (02 Apr 2025 04:55) (98% - 100%)    Parameters below as of 02 Apr 2025 04:55  Patient On (Oxygen Delivery Method): nasal cannula  O2 Flow (L/min): 2    PHYSICAL EXAM  Gen: Lying in bed, NAD  Resp: No increased WOB  Spine:  Dressing c/d/i    Motor:                   C5                C6              C7               C8           T1   R            5/5                5/5            5/5             5/5          5/5  L             5/5               5/5             5/5             5/5          5/5                L2             L3             L4               L5            S1  R         5/5           5/5          5/5             5/5           5/5  L          5/5          5/5           5/5             5/5           5/5    Sensory:            C5         C6         C7      C8       T1        (0=absent, 1=impaired, 2=normal, NT=not testable)  R         2            2           2        2         2  L          2            2           2        2         2               L2          L3         L4      L5       S1         (0=absent, 1=impaired, 2=normal, NT=not testable)  R         2            2            2        2        2  L          2            2           2        2         2    LEs:  Calves soft, no TTP b/l  WWP b/l    LABS    LABS:  cret

## 2025-04-02 NOTE — PROGRESS NOTE ADULT - ASSESSMENT
64-yo female w/PMHx of DM2, colonic polyps, HTN, HLD, MVP, and obesity who presents with neck, upper and lower back pain for many yrs. that radiates to scapular and LUE; she is s/p decompression of cervical spine with fusion on 3/27/25    Cervicalgia 2' spinal cord compression  - s/p decompression of cervical spine with fusion on 3/27/25  - MARSHAL removed 3/30/25  - OOB as tolerated  - TOV 3/28/25 --> passed  - naproxen upon discharge  - PT/OT underway --> outpt PT + no skilled OT needs  - appreciate orthopedic intervention and post-op care    Mild tachycardia  - noted HR low 100s intermittently during this admission  - reviewed her two EKGs from 3/29/25 in her physical chart; nl axis, no ST/T wave changes, no evidence for bundle branch block, no Q waves  - pt reiterated to me that she has a relatively higher baseline HR in the outpt setting, ?body habitus?  - my suspicious for PE is very low given her nl LE venous dopplers coupled with her lack of any palpitations/dizziness/chest pain/SOB   - from my viewpoint, pt may be discharged and followup with her PCP within one-to-two weeks after discharge  - I counseled her to return immediately to the closest ER if she does experience any new-onset pulmonary/cardiac symptoms or any other new concerning symptoms/signs.  - pt sees card Dr. Hernandez outpt, states recent TTE and stress test all negative.     DM2 w/unspecified complications  - HgbA1c = 6.2  - fingersticks are acceptable under ISS  - to resume ozempic as outpt    HTN  - BP is currently controlled  - cont amlodipine    HLD  - cont atorvastatin in place of lovastatin    Disposition  - home 4/1/25  - PT/OT --> outpt PT + no skilled OT needs

## 2025-04-02 NOTE — PROGRESS NOTE ADULT - ASSESSMENT
64y y/o Female s/p C3-7 PSF on 3/27/25     PLAN  -    WBAT  -    DVT ppx: Venodynes, ambulation   -    Resumed home meds  -    MARSHAL DC'd 3/30  -    WBAT   -    PT/OT  -    BLE dopplers negative  -    Dispo:  Home     Orthopaedic Surgery  [facility] [id number]  [facility]        [id number]  [facility]  [id number]/ [phone]

## 2025-04-02 NOTE — PROGRESS NOTE ADULT - SUBJECTIVE AND OBJECTIVE BOX
SUBJECTIVE/ OVERNIGHT EVENTS:  feels well  still with post op neck pain  pain meds helps but not lasting long  no cp, no sob, no n/v/d. no abdominal pain.  no headache, no dizziness.   +BM yesterday per the pt.      --------------------------------------------------------------------------------------------  LABS:            CAPILLARY BLOOD GLUCOSE      POCT Blood Glucose.: 107 mg/dL (02 Apr 2025 07:08)  POCT Blood Glucose.: 113 mg/dL (01 Apr 2025 21:45)  POCT Blood Glucose.: 96 mg/dL (01 Apr 2025 16:53)  POCT Blood Glucose.: 104 mg/dL (01 Apr 2025 11:35)            RADIOLOGY & ADDITIONAL TESTS:    Imaging Personally Reviewed:  [x] YES  [ ] NO    Consultant(s) Notes Reviewed:  [x] YES  [ ] NO    MEDICATIONS  (STANDING):  amLODIPine   Tablet 5 milliGRAM(s) Oral daily  atorvastatin 20 milliGRAM(s) Oral at bedtime  cyclobenzaprine 10 milliGRAM(s) Oral every 8 hours  dextrose 5%. 1000 milliLiter(s) (50 mL/Hr) IV Continuous <Continuous>  dextrose 5%. 1000 milliLiter(s) (100 mL/Hr) IV Continuous <Continuous>  dextrose 50% Injectable 25 Gram(s) IV Push once  dextrose 50% Injectable 12.5 Gram(s) IV Push once  dextrose 50% Injectable 25 Gram(s) IV Push once  gabapentin 100 milliGRAM(s) Oral every 8 hours  glucagon  Injectable 1 milliGRAM(s) IntraMuscular once  insulin lispro (ADMELOG) corrective regimen sliding scale   SubCutaneous three times a day before meals  insulin lispro (ADMELOG) corrective regimen sliding scale   SubCutaneous at bedtime  pantoprazole    Tablet 40 milliGRAM(s) Oral before breakfast  polyethylene glycol 3350 17 Gram(s) Oral daily  senna 2 Tablet(s) Oral at bedtime  sodium chloride 0.9%. 1000 milliLiter(s) (100 mL/Hr) IV Continuous <Continuous>    MEDICATIONS  (PRN):  dextrose Oral Gel 15 Gram(s) Oral once PRN Blood Glucose LESS THAN 70 milliGRAM(s)/deciliter  HYDROmorphone   Solution 2 milliGRAM(s) Oral every 3 hours PRN Moderate Pain (4 - 6)  HYDROmorphone   Solution 4 milliGRAM(s) Oral every 3 hours PRN Severe Pain (7 - 10)  HYDROmorphone  Injectable 0.25 milliGRAM(s) IV Push every 3 hours PRN Severe Breakthrough Pain (7 - 10)  naloxone Injectable 0.1 milliGRAM(s) IV Push every 3 minutes PRN For ANY of the following changes in patient status:  A. RR LESS THAN 10 breaths per minute, B. Oxygen saturation LESS THAN 90%, C. Sedation score of 6  ondansetron Injectable 4 milliGRAM(s) IV Push every 6 hours PRN Nausea      Care Discussed with Consultants/Other Providers [x] YES  [ ] NO    Vital Signs Last 24 Hrs  T(C): 37.4 (02 Apr 2025 09:52), Max: 37.4 (02 Apr 2025 09:52)  T(F): 99.3 (02 Apr 2025 09:52), Max: 99.3 (02 Apr 2025 09:52)  HR: 101 (02 Apr 2025 09:52) (100 - 107)  BP: 112/61 (02 Apr 2025 09:52) (112/61 - 140/75)  BP(mean): --  RR: 18 (02 Apr 2025 09:52) (17 - 18)  SpO2: 100% (02 Apr 2025 09:52) (98% - 100%)    Parameters below as of 02 Apr 2025 09:52  Patient On (Oxygen Delivery Method): nasal cannula  O2 Flow (L/min): 2    I&O's Summary    01 Apr 2025 07:01  -  02 Apr 2025 07:00  --------------------------------------------------------  IN: 690 mL / OUT: 1080 mL / NET: -390 mL      PHYSICAL EXAM:  GENERAL: NAD, well-developed, comfortable on NC  HEAD:  Atraumatic, Normocephalic  EYES: EOMI, PERRLA, conjunctiva and sclera clear  NECK: Supple, No JVD, dressing d/c/i  CHEST/LUNG: Clear to auscultation bilaterally; No wheeze  HEART: Regular rate and rhythm; No murmurs, rubs, or gallops  ABDOMEN: Soft, Nontender, Nondistended; Bowel sounds present  Neuro: AAOx3, no focal weakness, 5/5 b/l extremity strength  EXTREMITIES:  2+ Peripheral Pulses, No clubbing, cyanosis, or edema  SKIN: No rashes or lesions

## 2025-04-03 LAB
GLUCOSE BLDC GLUCOMTR-MCNC: 112 MG/DL — HIGH (ref 70–99)
GLUCOSE BLDC GLUCOMTR-MCNC: 113 MG/DL — HIGH (ref 70–99)
GLUCOSE BLDC GLUCOMTR-MCNC: 119 MG/DL — HIGH (ref 70–99)
GLUCOSE BLDC GLUCOMTR-MCNC: 97 MG/DL — SIGNIFICANT CHANGE UP (ref 70–99)

## 2025-04-03 RX ORDER — DIAZEPAM 2 MG/1
5 TABLET ORAL EVERY 8 HOURS
Refills: 0 | Status: DISCONTINUED | OUTPATIENT
Start: 2025-04-03 | End: 2025-04-07

## 2025-04-03 RX ORDER — GABAPENTIN 400 MG/1
300 CAPSULE ORAL EVERY 8 HOURS
Refills: 0 | Status: DISCONTINUED | OUTPATIENT
Start: 2025-04-03 | End: 2025-04-05

## 2025-04-03 RX ADMIN — GABAPENTIN 100 MILLIGRAM(S): 400 CAPSULE ORAL at 05:18

## 2025-04-03 RX ADMIN — Medication 4 MILLIGRAM(S): at 08:55

## 2025-04-03 RX ADMIN — DIAZEPAM 5 MILLIGRAM(S): 2 TABLET ORAL at 21:16

## 2025-04-03 RX ADMIN — Medication 4 MILLIGRAM(S): at 13:15

## 2025-04-03 RX ADMIN — ATORVASTATIN CALCIUM 20 MILLIGRAM(S): 80 TABLET, FILM COATED ORAL at 21:16

## 2025-04-03 RX ADMIN — Medication 40 MILLIGRAM(S): at 05:18

## 2025-04-03 RX ADMIN — Medication 4 MILLIGRAM(S): at 17:55

## 2025-04-03 RX ADMIN — Medication 4 MILLIGRAM(S): at 07:57

## 2025-04-03 RX ADMIN — CYCLOBENZAPRINE HYDROCHLORIDE 10 MILLIGRAM(S): 15 CAPSULE, EXTENDED RELEASE ORAL at 05:18

## 2025-04-03 RX ADMIN — Medication 4 MILLIGRAM(S): at 23:47

## 2025-04-03 RX ADMIN — Medication 4 MILLIGRAM(S): at 22:47

## 2025-04-03 RX ADMIN — GABAPENTIN 300 MILLIGRAM(S): 400 CAPSULE ORAL at 21:16

## 2025-04-03 RX ADMIN — GABAPENTIN 300 MILLIGRAM(S): 400 CAPSULE ORAL at 13:28

## 2025-04-03 RX ADMIN — Medication 4 MILLIGRAM(S): at 16:59

## 2025-04-03 RX ADMIN — Medication 4 MILLIGRAM(S): at 12:16

## 2025-04-03 RX ADMIN — AMLODIPINE BESYLATE 5 MILLIGRAM(S): 10 TABLET ORAL at 05:18

## 2025-04-03 RX ADMIN — DIAZEPAM 5 MILLIGRAM(S): 2 TABLET ORAL at 13:28

## 2025-04-03 NOTE — PROGRESS NOTE ADULT - SUBJECTIVE AND OBJECTIVE BOX
SUBJECTIVE  Patient seen and examined at bedside. No acute events overnight. Reports posterior neck pain. Denies fevers/chills, chest pain, or dyspnea.    OBJECTIVE  Vital Signs Last 24 Hrs  T(C): 36.8 (03 Apr 2025 05:38), Max: 37.6 (02 Apr 2025 17:19)  T(F): 98.2 (03 Apr 2025 05:38), Max: 99.7 (02 Apr 2025 17:19)  HR: 99 (03 Apr 2025 05:38) (99 - 103)  BP: 116/70 (03 Apr 2025 05:38) (112/61 - 132/71)  BP(mean): --  RR: 16 (03 Apr 2025 05:38) (16 - 18)  SpO2: 100% (03 Apr 2025 05:38) (100% - 100%)    Parameters below as of 03 Apr 2025 05:38  Patient On (Oxygen Delivery Method): nasal cannula  O2 Flow (L/min): 2    PHYSICAL EXAM  Gen: Lying in bed, NAD  Resp: No increased WOB  Spine:  Dressing c/d/i    Motor:                   C5                C6              C7               C8           T1   R            5/5                5/5            5/5             5/5          5/5  L             5/5               5/5             5/5             5/5          5/5                L2             L3             L4               L5            S1  R         5/5           5/5          5/5             5/5           5/5  L          5/5          5/5           5/5             5/5           5/5    Sensory:            C5         C6         C7      C8       T1        (0=absent, 1=impaired, 2=normal, NT=not testable)  R         2            2           2        2         2  L          2            2           2        2         2               L2          L3         L4      L5       S1         (0=absent, 1=impaired, 2=normal, NT=not testable)  R         2            2            2        2        2  L          2            2           2        2         2    LEs:  Calves soft, no TTP b/l  WWP b/l    LABS    LABS:  cret               SUBJECTIVE  Patient seen and examined at bedside. No acute events overnight. Reports posterior neck pain. Denies fevers/chills, chest pain, or dyspnea.     OBJECTIVE  Vital Signs Last 24 Hrs  T(C): 36.8 (03 Apr 2025 05:38), Max: 37.6 (02 Apr 2025 17:19)  T(F): 98.2 (03 Apr 2025 05:38), Max: 99.7 (02 Apr 2025 17:19)  HR: 99 (03 Apr 2025 05:38) (99 - 103)  BP: 116/70 (03 Apr 2025 05:38) (112/61 - 132/71)  BP(mean): --  RR: 16 (03 Apr 2025 05:38) (16 - 18)  SpO2: 100% (03 Apr 2025 05:38) (100% - 100%)    Parameters below as of 03 Apr 2025 05:38  Patient On (Oxygen Delivery Method): nasal cannula  O2 Flow (L/min): 2    PHYSICAL EXAM  Gen: Lying in bed, NAD  Resp: No increased WOB  Spine:  Dressing c/d/i    Motor:                   C5                C6              C7               C8           T1   R            5/5                5/5            5/5             5/5          5/5  L             5/5               5/5             5/5             5/5          5/5                L2             L3             L4               L5            S1  R         5/5           5/5          5/5             5/5           5/5  L          5/5          5/5           5/5             5/5           5/5    Sensory:            C5         C6         C7      C8       T1        (0=absent, 1=impaired, 2=normal, NT=not testable)  R         2            2           2        2         2  L          2            2           2        2         2               L2          L3         L4      L5       S1         (0=absent, 1=impaired, 2=normal, NT=not testable)  R         2            2            2        2        2  L          2            2           2        2         2    LEs:  Calves soft, no TTP b/l  WWP b/l

## 2025-04-03 NOTE — DIETITIAN INITIAL EVALUATION ADULT - OTHER INFO
Pt seen at chairside. Observed patient drinking regular soda and potato chips at bedside. Pt reported good PO intake in house. Per RN flowsheet, patient with variable PO intake % noted. Pt ordered for Consistent Carbohydrate diet. Pt declined education at this time. Patient denies difficulty chewing or swallowing at present. No GI distress reported i.e. nausea, vomiting, diarrhea. Unknown last BM. Bowel regimen in placed. No edema, no PI/DTI noted, per RN flowsheet. Labs noted for HgA1c 6.2% which is indicative of good glucose control. RD remains available upon request and will follow up per protocol.

## 2025-04-03 NOTE — DIETITIAN INITIAL EVALUATION ADULT - PERTINENT LABORATORY DATA
POCT Blood Glucose.: 112 mg/dL (04-03-25 @ 10:59)  A1C with Estimated Average Glucose Result: 6.2 % (03-20-25 @ 17:35)

## 2025-04-03 NOTE — PROGRESS NOTE ADULT - ASSESSMENT
64y y/o Female s/p C3-7 PSF on 3/27/25     PLAN  -    WBAT  -    DVT ppx: Venodynes, ambulation   -    Resumed home meds  -    MARSHAL DC'd 3/30  -    WBAT   -    PT/OT  -    BLE dopplers negative  -    Dispo:  SISI    Orthopaedic Surgery  [facility] [id number]  [facility]        [id number]  [facility]  [id number]/ [phone]

## 2025-04-03 NOTE — DIETITIAN INITIAL EVALUATION ADULT - ORAL INTAKE PTA/DIET HISTORY
Patient reports good appetite/PO intake PTA, consumes a regular diet at baseline. Pt confirms NKFA, food intolerance. Pt reported UBW 232lbs/105.4kg. Pt denies any recent weight changes.

## 2025-04-03 NOTE — DIETITIAN INITIAL EVALUATION ADULT - REASON FOR ADMISSION
Spinal cord compression  Per chart, Pt is 64-yo female w/PMHx of DM2, colonic polyps, HTN, HLD, MVP, and obesity who presents with neck, upper and lower back pain for many yrs. that radiates to scapular and LUE; she is s/p decompression of cervical spine with fusion on 3/27/25

## 2025-04-03 NOTE — PROGRESS NOTE ADULT - ASSESSMENT
64-yo female w/PMHx of DM2, colonic polyps, HTN, HLD, MVP, and obesity who presents with neck, upper and lower back pain for many yrs. that radiates to scapular and LUE; she is s/p decompression of cervical spine with fusion on 3/27/25    Cervicalgia 2' spinal cord compression  - s/p decompression of cervical spine with fusion on 3/27/25  - MARSHAL removed 3/30/25  - OOB as tolerated  - TOV 3/28/25 --> passed  - naproxen upon discharge  - PT/OT underway --> outpt PT + no skilled OT needs  - appreciate orthopedic intervention and post-op care  - Gabapentin dose increase to 300 mg per pain management     Mild tachycardia  - noted HR low 100s intermittently during this admission  - reviewed her two EKGs from 3/29/25 in her physical chart; nl axis, no ST/T wave changes, no evidence for bundle branch block, no Q waves  - pt reiterated to me that she has a relatively higher baseline HR in the outpt setting, ?body habitus?  - my suspicious for PE is very low given her nl LE venous dopplers coupled with her lack of any palpitations/dizziness/chest pain/SOB   - from my viewpoint, pt may be discharged and followup with her PCP within one-to-two weeks after discharge  - I counseled her to return immediately to the closest ER if she does experience any new-onset pulmonary/cardiac symptoms or any other new concerning symptoms/signs.  - pt sees card Dr. Hernandez outpt, states recent TTE and stress test all negative.     DM2 w/unspecified complications  - HgbA1c = 6.2  - fingersticks are acceptable under ISS  - to resume ozempic as outpt    HTN  - BP is currently controlled  - cont amlodipine    HLD  - cont atorvastatin in place of lovastatin    Disposition  - home 4/1/25  - PT/OT --> outpt PT + no skilled OT needs

## 2025-04-03 NOTE — PROGRESS NOTE ADULT - SUBJECTIVE AND OBJECTIVE BOX
SUBJECTIVE/ OVERNIGHT EVENTS:  still having neck pain, gabapentin dose increased.  no cp, no sob, no n/v/d. no abdominal pain.  no headache, no dizziness.       --------------------------------------------------------------------------------------------  LABS:            CAPILLARY BLOOD GLUCOSE      POCT Blood Glucose.: 113 mg/dL (03 Apr 2025 16:31)  POCT Blood Glucose.: 112 mg/dL (03 Apr 2025 10:59)  POCT Blood Glucose.: 97 mg/dL (03 Apr 2025 07:21)  POCT Blood Glucose.: 113 mg/dL (02 Apr 2025 21:37)            RADIOLOGY & ADDITIONAL TESTS:    Imaging Personally Reviewed:  [x] YES  [ ] NO    Consultant(s) Notes Reviewed:  [x] YES  [ ] NO    MEDICATIONS  (STANDING):  amLODIPine   Tablet 5 milliGRAM(s) Oral daily  atorvastatin 20 milliGRAM(s) Oral at bedtime  dextrose 5%. 1000 milliLiter(s) (100 mL/Hr) IV Continuous <Continuous>  dextrose 5%. 1000 milliLiter(s) (50 mL/Hr) IV Continuous <Continuous>  dextrose 50% Injectable 25 Gram(s) IV Push once  dextrose 50% Injectable 12.5 Gram(s) IV Push once  dextrose 50% Injectable 25 Gram(s) IV Push once  diazepam    Tablet 5 milliGRAM(s) Oral every 8 hours  gabapentin 300 milliGRAM(s) Oral every 8 hours  glucagon  Injectable 1 milliGRAM(s) IntraMuscular once  insulin lispro (ADMELOG) corrective regimen sliding scale   SubCutaneous at bedtime  insulin lispro (ADMELOG) corrective regimen sliding scale   SubCutaneous three times a day before meals  pantoprazole    Tablet 40 milliGRAM(s) Oral before breakfast  polyethylene glycol 3350 17 Gram(s) Oral daily  senna 2 Tablet(s) Oral at bedtime  sodium chloride 0.9%. 1000 milliLiter(s) (100 mL/Hr) IV Continuous <Continuous>    MEDICATIONS  (PRN):  dextrose Oral Gel 15 Gram(s) Oral once PRN Blood Glucose LESS THAN 70 milliGRAM(s)/deciliter  HYDROmorphone   Solution 2 milliGRAM(s) Oral every 3 hours PRN Moderate Pain (4 - 6)  HYDROmorphone   Solution 4 milliGRAM(s) Oral every 3 hours PRN Severe Pain (7 - 10)  HYDROmorphone  Injectable 0.25 milliGRAM(s) IV Push every 3 hours PRN Severe Breakthrough Pain (7 - 10)  naloxone Injectable 0.1 milliGRAM(s) IV Push every 3 minutes PRN For ANY of the following changes in patient status:  A. RR LESS THAN 10 breaths per minute, B. Oxygen saturation LESS THAN 90%, C. Sedation score of 6  ondansetron Injectable 4 milliGRAM(s) IV Push every 6 hours PRN Nausea      Care Discussed with Consultants/Other Providers [x] YES  [ ] NO    Vital Signs Last 24 Hrs  T(C): 36.8 (03 Apr 2025 13:32), Max: 37.6 (02 Apr 2025 17:19)  T(F): 98.2 (03 Apr 2025 13:32), Max: 99.7 (02 Apr 2025 17:19)  HR: 98 (03 Apr 2025 13:32) (98 - 103)  BP: 107/59 (03 Apr 2025 13:32) (107/59 - 132/71)  BP(mean): --  RR: 18 (03 Apr 2025 13:32) (16 - 18)  SpO2: 100% (03 Apr 2025 13:32) (100% - 100%)    Parameters below as of 03 Apr 2025 13:32  Patient On (Oxygen Delivery Method): nasal cannula  O2 Flow (L/min): 1    I&O's Summary    02 Apr 2025 07:01  -  03 Apr 2025 07:00  --------------------------------------------------------  IN: 0 mL / OUT: 1400 mL / NET: -1400 mL        PHYSICAL EXAM:  GENERAL: NAD, well-developed, comfortable on NC  HEAD:  Atraumatic, Normocephalic  EYES: EOMI, PERRLA, conjunctiva and sclera clear  NECK: Supple, No JVD, dressing d/c/i  CHEST/LUNG: Clear to auscultation bilaterally; No wheeze  HEART: Regular rate and rhythm; No murmurs, rubs, or gallops  ABDOMEN: Soft, Nontender, Nondistended; Bowel sounds present  Neuro: AAOx3, no focal weakness, 5/5 b/l extremity strength  EXTREMITIES:  2+ Peripheral Pulses, No clubbing, cyanosis, or edema  SKIN: No rashes or lesions

## 2025-04-03 NOTE — DIETITIAN INITIAL EVALUATION ADULT - OTHER CALCULATIONS
Defer fluid needs to MD/Team.   Ideal Body Weight: 135lbs / 61.2kg +/-10%. IBW used to calculate nutritional estimated needs.   Wt hx as per [place] HIDEONNA:105.2kg (dosing wt 3/27), 107kg (1/3), 107kg (12/4), 106.6kg (10/30), 103.4kg (9/25), 104.3kg (7/8). Wt appears relatively stable.

## 2025-04-03 NOTE — PROGRESS NOTE ADULT - SUBJECTIVE AND OBJECTIVE BOX
Follow up consult for Acute Pain Management     SUBJECTIVE:  Received call from primary team for persistent post-op pain. The patient states she has pain with movement in her neck and back. Describes pain as sharp, electric shocks that are constantly stabbing. Also experiencing a lot of tightness not relieved by Flexeril. Has tried Methocarbamol and Tizanidine to no effect. Has not had Valium Patient states PO Dilaudid not effective and is making her sleepy. Cannot tolerate Oxycodone. She prefers to use non-opioid adjuvants rather than depend on the narcotics.  		  OBJECTIVE:  Patient is sitting in chair with cervical collar on.  Pain Score:  10/10 with movement (X) Refer to pain scores    Therapy:	[ ] IV PCA	[ ] Epidural   [ ] s/p Spinal Opioid	[ ] Peripheral nerve block  (x) PRN Oral/IV opioids and or Adjuvant non-opioid medications  	  Vital Signs Last 24 Hrs  T(C): 37.2 (03 Apr 2025 09:20), Max: 37.6 (02 Apr 2025 17:19)  T(F): 99 (03 Apr 2025 09:20), Max: 99.7 (02 Apr 2025 17:19)  HR: 100 (03 Apr 2025 09:20) (99 - 103)  BP: 115/70 (03 Apr 2025 09:20) (113/75 - 132/71)  BP(mean): --  RR: 16 (03 Apr 2025 05:38) (16 - 18)  SpO2: 100% (03 Apr 2025 09:20) (100% - 100%)    Parameters below as of 03 Apr 2025 09:20  Patient On (Oxygen Delivery Method): nasal cannula  O2 Flow (L/min): 1      ( x) Alert & Oriented     ( ) No motor/sensory block     ( ) Nausea     ( ) Pruritis     ( ) Headache    ASSESSMENT/ PLAN    Therapy to  be:	[x ] Continue   [ ] Discontinued      Documentation and Verification of current medications:   [X] Done	[ ] Not done, not elligible    Comments: Recommend increasing Gabapentin to 300mg TID and may uptitrate if effective. Flexeril discontinued and Valium 5mg Q8H ordered. May increase dosing to Q6H if needed.  Continue current pain regimen. PRN Oral/IV opioids and/or Adjuvant non-opioid medication to be ordered at this point.  Pain service to sign off, no further recommendations for pain medications, at this time.  May call pain service if needed.    Progress Note written now but Patient was seen earlier.

## 2025-04-03 NOTE — DIETITIAN INITIAL EVALUATION ADULT - PERTINENT MEDS FT
MEDICATIONS  (STANDING):  amLODIPine   Tablet 5 milliGRAM(s) Oral daily  atorvastatin 20 milliGRAM(s) Oral at bedtime  dextrose 5%. 1000 milliLiter(s) (100 mL/Hr) IV Continuous <Continuous>  dextrose 5%. 1000 milliLiter(s) (50 mL/Hr) IV Continuous <Continuous>  dextrose 50% Injectable 25 Gram(s) IV Push once  dextrose 50% Injectable 12.5 Gram(s) IV Push once  dextrose 50% Injectable 25 Gram(s) IV Push once  diazepam    Tablet 5 milliGRAM(s) Oral every 8 hours  gabapentin 300 milliGRAM(s) Oral every 8 hours  glucagon  Injectable 1 milliGRAM(s) IntraMuscular once  insulin lispro (ADMELOG) corrective regimen sliding scale   SubCutaneous at bedtime  insulin lispro (ADMELOG) corrective regimen sliding scale   SubCutaneous three times a day before meals  pantoprazole    Tablet 40 milliGRAM(s) Oral before breakfast  polyethylene glycol 3350 17 Gram(s) Oral daily  senna 2 Tablet(s) Oral at bedtime  sodium chloride 0.9%. 1000 milliLiter(s) (100 mL/Hr) IV Continuous <Continuous>    MEDICATIONS  (PRN):  dextrose Oral Gel 15 Gram(s) Oral once PRN Blood Glucose LESS THAN 70 milliGRAM(s)/deciliter  HYDROmorphone   Solution 2 milliGRAM(s) Oral every 3 hours PRN Moderate Pain (4 - 6)  HYDROmorphone   Solution 4 milliGRAM(s) Oral every 3 hours PRN Severe Pain (7 - 10)  HYDROmorphone  Injectable 0.25 milliGRAM(s) IV Push every 3 hours PRN Severe Breakthrough Pain (7 - 10)  naloxone Injectable 0.1 milliGRAM(s) IV Push every 3 minutes PRN For ANY of the following changes in patient status:  A. RR LESS THAN 10 breaths per minute, B. Oxygen saturation LESS THAN 90%, C. Sedation score of 6  ondansetron Injectable 4 milliGRAM(s) IV Push every 6 hours PRN Nausea

## 2025-04-04 LAB
GLUCOSE BLDC GLUCOMTR-MCNC: 102 MG/DL — HIGH (ref 70–99)
GLUCOSE BLDC GLUCOMTR-MCNC: 102 MG/DL — HIGH (ref 70–99)
GLUCOSE BLDC GLUCOMTR-MCNC: 121 MG/DL — HIGH (ref 70–99)
GLUCOSE BLDC GLUCOMTR-MCNC: 88 MG/DL — SIGNIFICANT CHANGE UP (ref 70–99)

## 2025-04-04 RX ORDER — HYDROMORPHONE/SOD CHLOR,ISO/PF 2 MG/10 ML
4 SYRINGE (ML) INJECTION
Refills: 0 | Status: DISCONTINUED | OUTPATIENT
Start: 2025-04-04 | End: 2025-04-07

## 2025-04-04 RX ORDER — HYDROMORPHONE/SOD CHLOR,ISO/PF 2 MG/10 ML
2 SYRINGE (ML) INJECTION
Refills: 0 | Status: DISCONTINUED | OUTPATIENT
Start: 2025-04-04 | End: 2025-04-07

## 2025-04-04 RX ADMIN — Medication 4 MILLIGRAM(S): at 17:30

## 2025-04-04 RX ADMIN — Medication 40 MILLIGRAM(S): at 06:46

## 2025-04-04 RX ADMIN — Medication 4 MILLIGRAM(S): at 11:01

## 2025-04-04 RX ADMIN — GABAPENTIN 300 MILLIGRAM(S): 400 CAPSULE ORAL at 13:19

## 2025-04-04 RX ADMIN — ATORVASTATIN CALCIUM 20 MILLIGRAM(S): 80 TABLET, FILM COATED ORAL at 22:58

## 2025-04-04 RX ADMIN — DIAZEPAM 5 MILLIGRAM(S): 2 TABLET ORAL at 13:20

## 2025-04-04 RX ADMIN — Medication 4 MILLIGRAM(S): at 16:30

## 2025-04-04 RX ADMIN — GABAPENTIN 300 MILLIGRAM(S): 400 CAPSULE ORAL at 06:45

## 2025-04-04 RX ADMIN — Medication 4 MILLIGRAM(S): at 10:01

## 2025-04-04 RX ADMIN — Medication 2 TABLET(S): at 22:58

## 2025-04-04 RX ADMIN — DIAZEPAM 5 MILLIGRAM(S): 2 TABLET ORAL at 22:58

## 2025-04-04 RX ADMIN — GABAPENTIN 300 MILLIGRAM(S): 400 CAPSULE ORAL at 21:21

## 2025-04-04 RX ADMIN — DIAZEPAM 5 MILLIGRAM(S): 2 TABLET ORAL at 06:46

## 2025-04-04 NOTE — PROGRESS NOTE ADULT - ASSESSMENT
64y y/o Female s/p C3-7 PSF on 3/27/25     PLAN  -    WBAT  -    DVT ppx: Venodynes, ambulation   -    Resumed home meds  -    MARSHAL DC'd 3/30  -    WBAT   -    PT/OT  -    BLE dopplers negative  -    Dispo:  SISI    Orthopaedic [place]  [facility] [id number]  [facility]        [id number]  [facility]  [id number]/ [phone]

## 2025-04-04 NOTE — PROGRESS NOTE ADULT - ASSESSMENT
64-yo female w/PMHx of DM2, colonic polyps, HTN, HLD, MVP, and obesity who presents with neck, upper and lower back pain for many yrs. that radiates to scapular and LUE; she is s/p decompression of cervical spine with fusion on 3/27/25    Cervicalgia 2' spinal cord compression  - s/p decompression of cervical spine with fusion on 3/27/25  - MARSHAL removed 3/30/25  - OOB as tolerated  - TOV 3/28/25 --> passed  - naproxen upon discharge, NSAID side effects discussed.   - PT/OT underway --> outpt PT + no skilled OT needs  - appreciate orthopedic intervention and post-op care  - Gabapentin dose increase to 300 mg per pain management    Mild tachycardia  - noted HR low 100s intermittently during this admission  - reviewed her two EKGs from 3/29/25 in her physical chart; nl axis, no ST/T wave changes, no evidence for bundle branch block, no Q waves  - pt reiterated to me that she has a relatively higher baseline HR in the outpt setting, ?body habitus?  - my suspicious for PE is very low given her nl LE venous dopplers coupled with her lack of any palpitations/dizziness/chest pain/SOB   - from my viewpoint, pt may be discharged and followup with her PCP within one-to-two weeks after discharge  - I counseled her to return immediately to the closest ER if she does experience any new-onset pulmonary/cardiac symptoms or any other new concerning symptoms/signs.  - pt sees card Dr. Hernandez outpt, states recent TTE and stress test all negative.     DM2 w/unspecified complications  - HgbA1c = 6.2  - fingersticks are acceptable under ISS  - to resume ozempic as outpt    HTN  - BP is currently controlled  - cont amlodipine    HLD  - cont atorvastatin in place of lovastatin    Disposition  - home 4/1/25  - PT/OT --> outpt PT + no skilled OT needs

## 2025-04-04 NOTE — PROGRESS NOTE ADULT - SUBJECTIVE AND OBJECTIVE BOX
SUBJECTIVE  Patient seen and examined at bedside. No acute events overnight. Reports posterior neck pain. Denies fevers/chills, chest pain, or dyspnea.    OBJECTIVE  Vital Signs Last 24 Hrs  T(C): 37.5 (03 Apr 2025 22:00), Max: 37.5 (03 Apr 2025 22:00)  T(F): 99.5 (03 Apr 2025 22:00), Max: 99.5 (03 Apr 2025 22:00)  HR: 100 (03 Apr 2025 22:00) (98 - 100)  BP: 110/46 (03 Apr 2025 22:00) (104/67 - 115/70)  BP(mean): --  RR: 18 (03 Apr 2025 22:00) (17 - 18)  SpO2: 96% (03 Apr 2025 22:00) (96% - 100%)    Parameters below as of 03 Apr 2025 22:00  Patient On (Oxygen Delivery Method): nasal cannula  O2 Flow (L/min): 1      PHYSICAL EXAM  Gen: Lying in bed, NAD  Resp: No increased WOB  Spine:  Dressing c/d/i    Motor:                   C5                C6              C7               C8           T1   R            5/5                5/5            5/5             5/5          5/5  L             5/5               5/5             5/5             5/5          5/5                L2             L3             L4               L5            S1  R         5/5           5/5          5/5             5/5           5/5  L          5/5          5/5           5/5             5/5           5/5    Sensory:            C5         C6         C7      C8       T1        (0=absent, 1=impaired, 2=normal, NT=not testable)  R         2            2           2        2         2  L          2            2           2        2         2               L2          L3         L4      L5       S1         (0=absent, 1=impaired, 2=normal, NT=not testable)  R         2            2            2        2        2  L          2            2           2        2         2    LEs:  Calves soft, no TTP b/l  WWP b/l    LABS

## 2025-04-04 NOTE — PROGRESS NOTE ADULT - ATTENDING COMMENTS
doing well.  Neck pain better controlled.  Has pain in shoulder sand neck with movement.  Left hand numbness improved from preop awaiting SISI placement.
doing well.  Neck pain better controlled.  Has pain in shoulder sand neck with movement.  Left hand numbness improved from preop awaiting SISI placement.
doing well. Main issue is incisional pain.  Has intermittent left hand numbness improved from prior to OR   Pain cotntrol C/w PT  Dispo planning

## 2025-04-04 NOTE — PROGRESS NOTE ADULT - SUBJECTIVE AND OBJECTIVE BOX
SUBJECTIVE/ OVERNIGHT EVENTS:  Pt sitting up out of bed.  feels well. pain is a little better except when she gets "spasms"  no chest pain, no shortness of breath.   comfortable. no n/v/d. no abd pain.  no HA/dizziness.   rehab planning in progress      --------------------------------------------------------------------------------------------  LABS:            CAPILLARY BLOOD GLUCOSE      POCT Blood Glucose.: 102 mg/dL (04 Apr 2025 11:22)  POCT Blood Glucose.: 102 mg/dL (04 Apr 2025 07:32)  POCT Blood Glucose.: 119 mg/dL (03 Apr 2025 21:36)  POCT Blood Glucose.: 113 mg/dL (03 Apr 2025 16:31)            RADIOLOGY & ADDITIONAL TESTS:    Imaging Personally Reviewed:  [x] YES  [ ] NO    Consultant(s) Notes Reviewed:  [x] YES  [ ] NO    MEDICATIONS  (STANDING):  amLODIPine   Tablet 5 milliGRAM(s) Oral daily  atorvastatin 20 milliGRAM(s) Oral at bedtime  dextrose 5%. 1000 milliLiter(s) (50 mL/Hr) IV Continuous <Continuous>  dextrose 5%. 1000 milliLiter(s) (100 mL/Hr) IV Continuous <Continuous>  dextrose 50% Injectable 25 Gram(s) IV Push once  dextrose 50% Injectable 12.5 Gram(s) IV Push once  dextrose 50% Injectable 25 Gram(s) IV Push once  diazepam    Tablet 5 milliGRAM(s) Oral every 8 hours  gabapentin 300 milliGRAM(s) Oral every 8 hours  glucagon  Injectable 1 milliGRAM(s) IntraMuscular once  insulin lispro (ADMELOG) corrective regimen sliding scale   SubCutaneous three times a day before meals  insulin lispro (ADMELOG) corrective regimen sliding scale   SubCutaneous at bedtime  pantoprazole    Tablet 40 milliGRAM(s) Oral before breakfast  polyethylene glycol 3350 17 Gram(s) Oral daily  senna 2 Tablet(s) Oral at bedtime  sodium chloride 0.9%. 1000 milliLiter(s) (100 mL/Hr) IV Continuous <Continuous>    MEDICATIONS  (PRN):  dextrose Oral Gel 15 Gram(s) Oral once PRN Blood Glucose LESS THAN 70 milliGRAM(s)/deciliter  HYDROmorphone   Solution 2 milliGRAM(s) Oral every 3 hours PRN Moderate Pain (4 - 6)  HYDROmorphone   Solution 4 milliGRAM(s) Oral every 3 hours PRN Severe Pain (7 - 10)  HYDROmorphone  Injectable 0.25 milliGRAM(s) IV Push every 3 hours PRN Severe Breakthrough Pain (7 - 10)  naloxone Injectable 0.1 milliGRAM(s) IV Push every 3 minutes PRN For ANY of the following changes in patient status:  A. RR LESS THAN 10 breaths per minute, B. Oxygen saturation LESS THAN 90%, C. Sedation score of 6  ondansetron Injectable 4 milliGRAM(s) IV Push every 6 hours PRN Nausea      Care Discussed with Consultants/Other Providers [x] YES  [ ] NO    Vital Signs Last 24 Hrs  T(C): 37.4 (04 Apr 2025 10:06), Max: 37.5 (03 Apr 2025 22:00)  T(F): 99.3 (04 Apr 2025 10:06), Max: 99.5 (03 Apr 2025 22:00)  HR: 102 (04 Apr 2025 10:06) (100 - 102)  BP: 108/80 (04 Apr 2025 10:06) (103/63 - 110/46)  BP(mean): --  RR: 18 (04 Apr 2025 10:06) (17 - 18)  SpO2: 100% (04 Apr 2025 10:06) (96% - 100%)    Parameters below as of 04 Apr 2025 10:06  Patient On (Oxygen Delivery Method): nasal cannula      I&O's Summary    03 Apr 2025 07:01  -  04 Apr 2025 07:00  --------------------------------------------------------  IN: 0 mL / OUT: 500 mL / NET: -500 mL      PHYSICAL EXAM:  GENERAL: NAD, well-developed, comfortable on room air  HEAD:  Atraumatic, Normocephalic  EYES: EOMI, PERRLA, conjunctiva and sclera clear  NECK: Supple, No JVD, dressing d/c/i  CHEST/LUNG: Clear to auscultation bilaterally; No wheeze  HEART: Regular rate and rhythm; No murmurs, rubs, or gallops  ABDOMEN: Soft, Nontender, Nondistended; Bowel sounds present  Neuro: AAOx3, no focal weakness, 5/5 b/l extremity strength  EXTREMITIES:  2+ Peripheral Pulses, No clubbing, cyanosis, or edema  SKIN: No rashes or lesions

## 2025-04-05 LAB
GLUCOSE BLDC GLUCOMTR-MCNC: 105 MG/DL — HIGH (ref 70–99)
GLUCOSE BLDC GLUCOMTR-MCNC: 107 MG/DL — HIGH (ref 70–99)
GLUCOSE BLDC GLUCOMTR-MCNC: 113 MG/DL — HIGH (ref 70–99)
GLUCOSE BLDC GLUCOMTR-MCNC: 119 MG/DL — HIGH (ref 70–99)

## 2025-04-05 RX ORDER — GABAPENTIN 400 MG/1
300 CAPSULE ORAL THREE TIMES A DAY
Refills: 0 | Status: DISCONTINUED | OUTPATIENT
Start: 2025-04-05 | End: 2025-04-07

## 2025-04-05 RX ADMIN — AMLODIPINE BESYLATE 5 MILLIGRAM(S): 10 TABLET ORAL at 05:12

## 2025-04-05 RX ADMIN — Medication 4 MILLIGRAM(S): at 20:12

## 2025-04-05 RX ADMIN — ATORVASTATIN CALCIUM 20 MILLIGRAM(S): 80 TABLET, FILM COATED ORAL at 21:23

## 2025-04-05 RX ADMIN — Medication 4 MILLIGRAM(S): at 10:20

## 2025-04-05 RX ADMIN — GABAPENTIN 300 MILLIGRAM(S): 400 CAPSULE ORAL at 07:39

## 2025-04-05 RX ADMIN — DIAZEPAM 5 MILLIGRAM(S): 2 TABLET ORAL at 21:22

## 2025-04-05 RX ADMIN — DIAZEPAM 5 MILLIGRAM(S): 2 TABLET ORAL at 13:43

## 2025-04-05 RX ADMIN — Medication 40 MILLIGRAM(S): at 05:12

## 2025-04-05 RX ADMIN — GABAPENTIN 300 MILLIGRAM(S): 400 CAPSULE ORAL at 23:01

## 2025-04-05 RX ADMIN — Medication 4 MILLIGRAM(S): at 19:12

## 2025-04-05 RX ADMIN — DIAZEPAM 5 MILLIGRAM(S): 2 TABLET ORAL at 05:12

## 2025-04-05 RX ADMIN — Medication 4 MILLIGRAM(S): at 11:20

## 2025-04-05 NOTE — PROGRESS NOTE ADULT - SUBJECTIVE AND OBJECTIVE BOX
SUBJECTIVE/ OVERNIGHT EVENTS:  stable overall.  awake alert. comfortable.  +neck pain, pain meds helping.   No cp, no sob. No abd pain.  denied pain.  no n/v/d.   tolerating diet.       --------------------------------------------------------------------------------------------  LABS:            CAPILLARY BLOOD GLUCOSE      POCT Blood Glucose.: 105 mg/dL (05 Apr 2025 07:32)  POCT Blood Glucose.: 88 mg/dL (04 Apr 2025 21:33)  POCT Blood Glucose.: 121 mg/dL (04 Apr 2025 16:36)  POCT Blood Glucose.: 102 mg/dL (04 Apr 2025 11:22)            RADIOLOGY & ADDITIONAL TESTS:    Imaging Personally Reviewed:  [x] YES  [ ] NO    Consultant(s) Notes Reviewed:  [x] YES  [ ] NO    MEDICATIONS  (STANDING):  amLODIPine   Tablet 5 milliGRAM(s) Oral daily  atorvastatin 20 milliGRAM(s) Oral at bedtime  dextrose 5%. 1000 milliLiter(s) (100 mL/Hr) IV Continuous <Continuous>  dextrose 5%. 1000 milliLiter(s) (50 mL/Hr) IV Continuous <Continuous>  dextrose 50% Injectable 25 Gram(s) IV Push once  dextrose 50% Injectable 12.5 Gram(s) IV Push once  dextrose 50% Injectable 25 Gram(s) IV Push once  diazepam    Tablet 5 milliGRAM(s) Oral every 8 hours  gabapentin 300 milliGRAM(s) Oral every 8 hours  glucagon  Injectable 1 milliGRAM(s) IntraMuscular once  insulin lispro (ADMELOG) corrective regimen sliding scale   SubCutaneous three times a day before meals  insulin lispro (ADMELOG) corrective regimen sliding scale   SubCutaneous at bedtime  pantoprazole    Tablet 40 milliGRAM(s) Oral before breakfast  polyethylene glycol 3350 17 Gram(s) Oral daily  senna 2 Tablet(s) Oral at bedtime  sodium chloride 0.9%. 1000 milliLiter(s) (100 mL/Hr) IV Continuous <Continuous>    MEDICATIONS  (PRN):  dextrose Oral Gel 15 Gram(s) Oral once PRN Blood Glucose LESS THAN 70 milliGRAM(s)/deciliter  HYDROmorphone   Solution 2 milliGRAM(s) Oral every 3 hours PRN Moderate Pain (4 - 6)  HYDROmorphone   Solution 4 milliGRAM(s) Oral every 3 hours PRN Severe Pain (7 - 10)  naloxone Injectable 0.1 milliGRAM(s) IV Push every 3 minutes PRN For ANY of the following changes in patient status:  A. RR LESS THAN 10 breaths per minute, B. Oxygen saturation LESS THAN 90%, C. Sedation score of 6  ondansetron Injectable 4 milliGRAM(s) IV Push every 6 hours PRN Nausea      Care Discussed with Consultants/Other Providers [x] YES  [ ] NO    Vital Signs Last 24 Hrs  T(C): 36.9 (05 Apr 2025 05:05), Max: 37.4 (04 Apr 2025 10:06)  T(F): 98.5 (05 Apr 2025 05:05), Max: 99.3 (04 Apr 2025 10:06)  HR: 101 (05 Apr 2025 05:05) (99 - 108)  BP: 111/70 (05 Apr 2025 05:05) (108/80 - 135/71)  BP(mean): --  RR: 18 (05 Apr 2025 05:05) (16 - 18)  SpO2: 97% (05 Apr 2025 05:05) (95% - 100%)    Parameters below as of 05 Apr 2025 05:05  Patient On (Oxygen Delivery Method): room air      I&O's Summary    04 Apr 2025 07:01  -  05 Apr 2025 07:00  --------------------------------------------------------  IN: 0 mL / OUT: 1000 mL / NET: -1000 mL            PHYSICAL EXAM:  GENERAL: NAD, well-developed, comfortable on room air  HEAD:  Atraumatic, Normocephalic  EYES: EOMI, PERRLA, conjunctiva and sclera clear  NECK: Supple, No JVD, dressing d/c/i  CHEST/LUNG: Clear to auscultation bilaterally; No wheeze  HEART: Regular rate and rhythm; No murmurs, rubs, or gallops  ABDOMEN: Soft, Nontender, Nondistended; Bowel sounds present  Neuro: AAOx3, no focal weakness, 5/5 b/l extremity strength  EXTREMITIES:  2+ Peripheral Pulses, No clubbing, cyanosis, or edema  SKIN: No rashes or lesions

## 2025-04-05 NOTE — PROGRESS NOTE ADULT - ASSESSMENT
64y y/o Female s/p C3-7 PSF on 3/27/25     PLAN  -    WBAT  -    DVT ppx: Venodynes, ambulation   -    Resumed home meds  -    MARSHAL DC'd 3/30  -    WBAT   -    PT/OT  -    BLE dopplers negative  -    Dispo:  SIS[facility]    Orthopaedic Surgery  [facility] [place] [id number]  [facility]        [id number]  [facility]  [id number]/ [phone]

## 2025-04-05 NOTE — PROGRESS NOTE ADULT - SUBJECTIVE AND OBJECTIVE BOX
SUBJECTIVE  Patient seen and examined at bedside. No acute events overnight. Reports posterior neck pain. Denies fevers/chills, chest pain, or dyspnea.    OBJECTIVE  Vital Signs Last 24 Hrs  T(C): 36.9 (05 Apr 2025 05:05), Max: 37.4 (04 Apr 2025 10:06)  T(F): 98.5 (05 Apr 2025 05:05), Max: 99.3 (04 Apr 2025 10:06)  HR: 101 (05 Apr 2025 05:05) (99 - 108)  BP: 111/70 (05 Apr 2025 05:05) (108/80 - 135/71)  BP(mean): --  RR: 18 (05 Apr 2025 05:05) (16 - 18)  SpO2: 97% (05 Apr 2025 05:05) (95% - 100%)    Parameters below as of 05 Apr 2025 05:05  Patient On (Oxygen Delivery Method): room air      PHYSICAL EXAM  Gen: Lying in bed, NAD  Resp: No increased WOB  Spine:  Dressing c/d/i    Motor:                   C5                C6              C7               C8           T1   R            5/5                5/5            5/5             5/5          5/5  L             5/5               5/5             5/5             5/5          5/5                L2             L3             L4               L5            S1  R         5/5           5/5          5/5             5/5           5/5  L          5/5          5/5           5/5             5/5           5/5    Sensory:            C5         C6         C7      C8       T1        (0=absent, 1=impaired, 2=normal, NT=not testable)  R         2            2           2        2         2  L          2            2           2        2         2               L2          L3         L4      L5       S1         (0=absent, 1=impaired, 2=normal, NT=not testable)  R         2            2            2        2        2  L          2            2           2        2         2    LEs:  Calves soft, no TTP b/l  WWP b/l    LABS

## 2025-04-05 NOTE — PROGRESS NOTE ADULT - ASSESSMENT
64-yo female w/PMHx of DM2, colonic polyps, HTN, HLD, MVP, and obesity who presents with neck, upper and lower back pain for many yrs. that radiates to scapular and LUE; she is s/p decompression of cervical spine with fusion on 3/27/25    Cervicalgia 2' spinal cord compression  - s/p decompression of cervical spine with fusion on 3/27/25  - MARSHAL removed 3/30/25  - OOB as tolerated  - TOV 3/28/25 --> passed  - naproxen upon discharge, NSAID side effects discussed.   - PT/OT underway --> outpt PT + no skilled OT needs  - appreciate orthopedic intervention and post-op care  - Gabapentin dose increase to 300 mg per pain management  - c/w opioids prn, side effects discussed. monitor BM    Mild tachycardia  - noted HR low 100s intermittently during this admission  - reviewed her two EKGs from 3/29/25 in her physical chart; nl axis, no ST/T wave changes, no evidence for bundle branch block, no Q waves  - pt reiterated to me that she has a relatively higher baseline HR in the outpt setting, ?body habitus?  - my suspicious for PE is very low given her nl LE venous dopplers coupled with her lack of any palpitations/dizziness/chest pain/SOB   - from my viewpoint, pt may be discharged and followup with her PCP within one-to-two weeks after discharge  - I counseled her to return immediately to the closest ER if she does experience any new-onset pulmonary/cardiac symptoms or any other new concerning symptoms/signs.  - pt sees card Dr. Hernandez outpt, states recent TTE and stress test all negative.     DM2 w/unspecified complications  - HgbA1c = 6.2  - fingersticks are acceptable under ISS  - to resume ozempic as outpt    HTN  - BP is currently controlled  - cont amlodipine    HLD  - cont atorvastatin in place of lovastatin    Disposition  - home 4/1/25  - PT/OT --> outpt PT + no skilled OT needs

## 2025-04-06 LAB
GLUCOSE BLDC GLUCOMTR-MCNC: 105 MG/DL — HIGH (ref 70–99)
GLUCOSE BLDC GLUCOMTR-MCNC: 112 MG/DL — HIGH (ref 70–99)
GLUCOSE BLDC GLUCOMTR-MCNC: 117 MG/DL — HIGH (ref 70–99)
GLUCOSE BLDC GLUCOMTR-MCNC: 97 MG/DL — SIGNIFICANT CHANGE UP (ref 70–99)

## 2025-04-06 RX ORDER — POLYETHYLENE GLYCOL 3350 17 G/17G
17 POWDER, FOR SOLUTION ORAL
Refills: 0 | Status: DISCONTINUED | OUTPATIENT
Start: 2025-04-06 | End: 2025-04-07

## 2025-04-06 RX ADMIN — Medication 4 MILLIGRAM(S): at 16:12

## 2025-04-06 RX ADMIN — Medication 4 MILLIGRAM(S): at 20:35

## 2025-04-06 RX ADMIN — Medication 4 MILLIGRAM(S): at 12:05

## 2025-04-06 RX ADMIN — GABAPENTIN 300 MILLIGRAM(S): 400 CAPSULE ORAL at 07:43

## 2025-04-06 RX ADMIN — DIAZEPAM 5 MILLIGRAM(S): 2 TABLET ORAL at 12:14

## 2025-04-06 RX ADMIN — Medication 4 MILLIGRAM(S): at 02:30

## 2025-04-06 RX ADMIN — Medication 4 MILLIGRAM(S): at 01:38

## 2025-04-06 RX ADMIN — GABAPENTIN 300 MILLIGRAM(S): 400 CAPSULE ORAL at 15:11

## 2025-04-06 RX ADMIN — Medication 4 MILLIGRAM(S): at 21:30

## 2025-04-06 RX ADMIN — GABAPENTIN 300 MILLIGRAM(S): 400 CAPSULE ORAL at 22:08

## 2025-04-06 RX ADMIN — DIAZEPAM 5 MILLIGRAM(S): 2 TABLET ORAL at 22:08

## 2025-04-06 RX ADMIN — ATORVASTATIN CALCIUM 20 MILLIGRAM(S): 80 TABLET, FILM COATED ORAL at 22:08

## 2025-04-06 RX ADMIN — Medication 4 MILLIGRAM(S): at 15:12

## 2025-04-06 RX ADMIN — Medication 4 MILLIGRAM(S): at 11:05

## 2025-04-06 NOTE — PROGRESS NOTE ADULT - SUBJECTIVE AND OBJECTIVE BOX
Orthopedic Surgery Progress Note     S: Patient seen and examined today. No acute events overnight. Pain is well controlled. Denies f/c, chest pain, shortness of breath, dizziness.    MEDICATIONS  (STANDING):  amLODIPine   Tablet 5 milliGRAM(s) Oral daily  atorvastatin 20 milliGRAM(s) Oral at bedtime  dextrose 5%. 1000 milliLiter(s) (50 mL/Hr) IV Continuous <Continuous>  dextrose 5%. 1000 milliLiter(s) (100 mL/Hr) IV Continuous <Continuous>  dextrose 50% Injectable 25 Gram(s) IV Push once  dextrose 50% Injectable 12.5 Gram(s) IV Push once  dextrose 50% Injectable 25 Gram(s) IV Push once  diazepam    Tablet 5 milliGRAM(s) Oral every 8 hours  gabapentin Solution 300 milliGRAM(s) Oral three times a day  glucagon  Injectable 1 milliGRAM(s) IntraMuscular once  insulin lispro (ADMELOG) corrective regimen sliding scale   SubCutaneous three times a day before meals  insulin lispro (ADMELOG) corrective regimen sliding scale   SubCutaneous at bedtime  pantoprazole    Tablet 40 milliGRAM(s) Oral before breakfast  polyethylene glycol 3350 17 Gram(s) Oral daily  senna 2 Tablet(s) Oral at bedtime  sodium chloride 0.9%. 1000 milliLiter(s) (100 mL/Hr) IV Continuous <Continuous>    MEDICATIONS  (PRN):  dextrose Oral Gel 15 Gram(s) Oral once PRN Blood Glucose LESS THAN 70 milliGRAM(s)/deciliter  HYDROmorphone   Solution 2 milliGRAM(s) Oral every 3 hours PRN Moderate Pain (4 - 6)  HYDROmorphone   Solution 4 milliGRAM(s) Oral every 3 hours PRN Severe Pain (7 - 10)  naloxone Injectable 0.1 milliGRAM(s) IV Push every 3 minutes PRN For ANY of the following changes in patient status:  A. RR LESS THAN 10 breaths per minute, B. Oxygen saturation LESS THAN 90%, C. Sedation score of 6  ondansetron Injectable 4 milliGRAM(s) IV Push every 6 hours PRN Nausea      Vital Signs Last 24 Hrs  T(C): 36.8 (06 Apr 2025 05:40), Max: 37.4 (06 Apr 2025 01:54)  T(F): 98.2 (06 Apr 2025 05:40), Max: 99.4 (06 Apr 2025 01:54)  HR: 102 (06 Apr 2025 05:40) (102 - 105)  BP: 113/78 (06 Apr 2025 05:40) (105/58 - 128/73)  BP(mean): --  RR: 18 (06 Apr 2025 05:40) (17 - 19)  SpO2: 98% (06 Apr 2025 05:40) (96% - 98%)    Parameters below as of 06 Apr 2025 05:40  Patient On (Oxygen Delivery Method): room air          Physical Exam:  Gen: NAD  Spine:  Dressing c/d/i    Motor:                   C5                C6              C7               C8           T1   R            5/5                5/5            5/5             5/5          5/5  L             5/5               5/5             5/5             5/5          5/5                L2             L3             L4               L5            S1  R         5/5           5/5          5/5             5/5           5/5  L          5/5          5/5           5/5             5/5           5/5    Sensory:            C5         C6         C7      C8       T1        (0=absent, 1=impaired, 2=normal, NT=not testable)  R         2            2           2        2         2  L          2            2           2        2         2               L2          L3         L4      L5       S1         (0=absent, 1=impaired, 2=normal, NT=not testable)  R         2            2            2        2        2  L          2            2           2        2         2    LEs:  Calves soft, no TTP b/l  WWP b/l      LABS:

## 2025-04-06 NOTE — PROGRESS NOTE ADULT - SUBJECTIVE AND OBJECTIVE BOX
SUBJECTIVE/ OVERNIGHT EVENTS:  pain better  no cp, no sob, no n/v/d. no abdominal pain.  no headache, no dizziness.       --------------------------------------------------------------------------------------------  LABS:            CAPILLARY BLOOD GLUCOSE      POCT Blood Glucose.: 105 mg/dL (06 Apr 2025 07:38)  POCT Blood Glucose.: 119 mg/dL (05 Apr 2025 21:38)  POCT Blood Glucose.: 113 mg/dL (05 Apr 2025 16:44)  POCT Blood Glucose.: 107 mg/dL (05 Apr 2025 11:32)            RADIOLOGY & ADDITIONAL TESTS:    Imaging Personally Reviewed:  [x] YES  [ ] NO    Consultant(s) Notes Reviewed:  [x] YES  [ ] NO    MEDICATIONS  (STANDING):  amLODIPine   Tablet 5 milliGRAM(s) Oral daily  atorvastatin 20 milliGRAM(s) Oral at bedtime  dextrose 5%. 1000 milliLiter(s) (50 mL/Hr) IV Continuous <Continuous>  dextrose 5%. 1000 milliLiter(s) (100 mL/Hr) IV Continuous <Continuous>  dextrose 50% Injectable 25 Gram(s) IV Push once  dextrose 50% Injectable 12.5 Gram(s) IV Push once  dextrose 50% Injectable 25 Gram(s) IV Push once  diazepam    Tablet 5 milliGRAM(s) Oral every 8 hours  gabapentin Solution 300 milliGRAM(s) Oral three times a day  glucagon  Injectable 1 milliGRAM(s) IntraMuscular once  insulin lispro (ADMELOG) corrective regimen sliding scale   SubCutaneous three times a day before meals  insulin lispro (ADMELOG) corrective regimen sliding scale   SubCutaneous at bedtime  pantoprazole    Tablet 40 milliGRAM(s) Oral before breakfast  polyethylene glycol 3350 17 Gram(s) Oral daily  senna 2 Tablet(s) Oral at bedtime  sodium chloride 0.9%. 1000 milliLiter(s) (100 mL/Hr) IV Continuous <Continuous>    MEDICATIONS  (PRN):  dextrose Oral Gel 15 Gram(s) Oral once PRN Blood Glucose LESS THAN 70 milliGRAM(s)/deciliter  HYDROmorphone   Solution 2 milliGRAM(s) Oral every 3 hours PRN Moderate Pain (4 - 6)  HYDROmorphone   Solution 4 milliGRAM(s) Oral every 3 hours PRN Severe Pain (7 - 10)  naloxone Injectable 0.1 milliGRAM(s) IV Push every 3 minutes PRN For ANY of the following changes in patient status:  A. RR LESS THAN 10 breaths per minute, B. Oxygen saturation LESS THAN 90%, C. Sedation score of 6  ondansetron Injectable 4 milliGRAM(s) IV Push every 6 hours PRN Nausea      Care Discussed with Consultants/Other Providers [x] YES  [ ] NO    Vital Signs Last 24 Hrs  T(C): 37.1 (06 Apr 2025 09:59), Max: 37.4 (06 Apr 2025 01:54)  T(F): 98.7 (06 Apr 2025 09:59), Max: 99.4 (06 Apr 2025 01:54)  HR: 95 (06 Apr 2025 09:59) (95 - 104)  BP: 112/66 (06 Apr 2025 09:59) (105/58 - 117/76)  BP(mean): --  RR: 18 (06 Apr 2025 09:59) (17 - 19)  SpO2: 95% (06 Apr 2025 09:59) (95% - 98%)    Parameters below as of 06 Apr 2025 09:59  Patient On (Oxygen Delivery Method): room air      I&O's Summary          PHYSICAL EXAM:  GENERAL: NAD, well-developed, comfortable on room air  HEAD:  Atraumatic, Normocephalic  EYES: EOMI, PERRLA, conjunctiva and sclera clear  NECK: Supple, No JVD, dressing d/c/i  CHEST/LUNG: Clear to auscultation bilaterally; No wheeze  HEART: Regular rate and rhythm; No murmurs, rubs, or gallops  ABDOMEN: Soft, Nontender, Nondistended; Bowel sounds present  Neuro: AAOx3, no focal weakness, 5/5 b/l extremity strength  EXTREMITIES:  2+ Peripheral Pulses, No clubbing, cyanosis, or edema  SKIN: No rashes or lesions

## 2025-04-06 NOTE — PROGRESS NOTE ADULT - ASSESSMENT
64y y/o Female s/p C3-7 PSF on 3/27/25     PLAN  -    WBAT  -    DVT ppx: Venodynes, ambulation   -    Resumed home meds  -    MARSHAL DC'd 3/30  -    WBAT   -    PT/OT  -    BLE dopplers negative

## 2025-04-06 NOTE — PROGRESS NOTE ADULT - ASSESSMENT
64-yo female w/PMHx of DM2, colonic polyps, HTN, HLD, MVP, and obesity who presents with neck, upper and lower back pain for many yrs. that radiates to scapular and LUE; she is s/p decompression of cervical spine with fusion on 3/27/25    Cervicalgia 2' spinal cord compression  - s/p decompression of cervical spine with fusion on 3/27/25  - MARSHAL removed 3/30/25  - OOB as tolerated  - TOV 3/28/25 --> passed  - naproxen upon discharge, NSAID side effects discussed.   - PT/OT underway --> outpt PT + no skilled OT needs  - appreciate orthopedic intervention and post-op care  - Gabapentin dose increase to 300 mg per pain management  - c/w opioids prn, side effects discussed. monitor BM, still no BM, increase Miralax to BID. +flatus    Mild tachycardia  - noted HR low 100s intermittently during this admission  - reviewed her two EKGs from 3/29/25 in her physical chart; nl axis, no ST/T wave changes, no evidence for bundle branch block, no Q waves  - pt reiterated to me that she has a relatively higher baseline HR in the outpt setting, ?body habitus?  - my suspicious for PE is very low given her nl LE venous dopplers coupled with her lack of any palpitations/dizziness/chest pain/SOB   - from my viewpoint, pt may be discharged and followup with her PCP within one-to-two weeks after discharge  - I counseled her to return immediately to the closest ER if she does experience any new-onset pulmonary/cardiac symptoms or any other new concerning symptoms/signs.  - pt sees card Dr. Hernandez outpt, states recent TTE and stress test all negative.     DM2 w/unspecified complications  - HgbA1c = 6.2  - fingersticks are acceptable under ISS  - to resume ozempic as outpt    HTN  - BP is currently controlled  - cont amlodipine    HLD  - cont atorvastatin in place of lovastatin    Disposition  - home 4/1/25  - PT/OT --> outpt PT + no skilled OT needs

## 2025-04-07 VITALS
HEART RATE: 107 BPM | RESPIRATION RATE: 18 BRPM | DIASTOLIC BLOOD PRESSURE: 74 MMHG | TEMPERATURE: 98 F | OXYGEN SATURATION: 96 % | SYSTOLIC BLOOD PRESSURE: 130 MMHG

## 2025-04-07 LAB
GLUCOSE BLDC GLUCOMTR-MCNC: 117 MG/DL — HIGH (ref 70–99)
GLUCOSE BLDC GLUCOMTR-MCNC: 118 MG/DL — HIGH (ref 70–99)

## 2025-04-07 RX ORDER — POLYETHYLENE GLYCOL 3350 17 G/17G
17 POWDER, FOR SOLUTION ORAL
Qty: 0 | Refills: 0 | DISCHARGE
Start: 2025-04-07

## 2025-04-07 RX ORDER — SENNA 187 MG
2 TABLET ORAL
Qty: 0 | Refills: 0 | DISCHARGE
Start: 2025-04-07

## 2025-04-07 RX ORDER — HYDROMORPHONE/SOD CHLOR,ISO/PF 2 MG/10 ML
4 SYRINGE (ML) INJECTION
Qty: 0 | Refills: 0 | DISCHARGE
Start: 2025-04-07

## 2025-04-07 RX ORDER — HYDROMORPHONE/SOD CHLOR,ISO/PF 2 MG/10 ML
2 SYRINGE (ML) INJECTION
Qty: 0 | Refills: 0 | DISCHARGE
Start: 2025-04-07

## 2025-04-07 RX ORDER — GABAPENTIN 400 MG/1
6 CAPSULE ORAL
Qty: 0 | Refills: 0 | DISCHARGE
Start: 2025-04-07

## 2025-04-07 RX ADMIN — DIAZEPAM 5 MILLIGRAM(S): 2 TABLET ORAL at 13:59

## 2025-04-07 RX ADMIN — GABAPENTIN 300 MILLIGRAM(S): 400 CAPSULE ORAL at 13:02

## 2025-04-07 RX ADMIN — Medication 4 MILLIGRAM(S): at 10:18

## 2025-04-07 RX ADMIN — Medication 4 MILLIGRAM(S): at 02:49

## 2025-04-07 RX ADMIN — GABAPENTIN 300 MILLIGRAM(S): 400 CAPSULE ORAL at 05:28

## 2025-04-07 RX ADMIN — Medication 4 MILLIGRAM(S): at 14:02

## 2025-04-07 RX ADMIN — Medication 4 MILLIGRAM(S): at 09:18

## 2025-04-07 RX ADMIN — Medication 4 MILLIGRAM(S): at 13:01

## 2025-04-07 RX ADMIN — DIAZEPAM 5 MILLIGRAM(S): 2 TABLET ORAL at 05:28

## 2025-04-07 RX ADMIN — Medication 40 MILLIGRAM(S): at 05:28

## 2025-04-07 RX ADMIN — Medication 4 MILLIGRAM(S): at 01:40

## 2025-04-07 NOTE — PROGRESS NOTE ADULT - NSPROGADDITIONALINFOA_GEN_ALL_CORE
rehab planning.  DVT ppx once post op bleeding risk acceptable.     - Dr. ABDON Palacioset ([facility])  - [phone]
rehab planning.  DVT ppx once post op bleeding risk acceptable.     - Dr. ABDON Palacioset ([facility])  - [phone]
- Dr. ABDON Sanches ([facility])  - [phone]
rehab planning.  DVT ppx once post op bleeding risk acceptable.     - Dr. ABDON Palacioset ([facility])  - [phone]
rehab planning.  DVT ppx once post op bleeding risk acceptable.   d/w pt and the daughter at bedside.   Physical therapy. Out of bed to chair with assistance.     - Dr. ABDON Sanches ([facility])  - [phone]
rehab planning.  DVT ppx once post op bleeding risk acceptable.     - Dr. ABDON Palacioset ([facility])  - [phone]

## 2025-04-07 NOTE — PROGRESS NOTE ADULT - ASSESSMENT
64y y/o Female s/p C3-7 PSF on 3/27/25     PLAN  -    WBAT  -    DVT ppx: Venodynes, ambulation   -    Resumed home meds  -    MARSHAL DC'd 3/30  -    WBAT   -    PT/OT  -    BLE dopplers negative  -    Dispo: [facility] Breen MD, PGY-2  Orthopaedic [place]  [facility] [id number]  [facility]        [id number]  [facility]  [id number]/ [phone]

## 2025-04-07 NOTE — PROGRESS NOTE ADULT - SUBJECTIVE AND OBJECTIVE BOX
SUBJECTIVE  Patient seen and examined at bedside. No acute events overnight. States pain is controlled. Denies fevers/chills, chest pain, or dyspnea.    OBJECTIVE  Vital Signs Last 24 Hrs  T(C): 37.1 (07 Apr 2025 05:33), Max: 37.4 (07 Apr 2025 02:03)  T(F): 98.7 (07 Apr 2025 05:33), Max: 99.3 (07 Apr 2025 02:03)  HR: 104 (07 Apr 2025 05:33) (95 - 109)  BP: 101/67 (07 Apr 2025 05:33) (101/67 - 121/71)  BP(mean): --  RR: 17 (07 Apr 2025 05:33) (17 - 18)  SpO2: 94% (07 Apr 2025 05:33) (94% - 99%)    Parameters below as of 07 Apr 2025 05:33  Patient On (Oxygen Delivery Method): room air        PHYSICAL EXAM  Gen: Lying in bed, NAD  Resp: No increased WOB  Spine:  incision c/d/i    Motor:                   C5                C6              C7               C8           T1   R            5/5                5/5            5/5             5/5          5/5  L             5/5               5/5             5/5             5/5          5/5                L2             L3             L4               L5            S1  R         5/5           5/5          5/5             5/5           5/5  L          5/5          5/5           5/5             5/5           5/5    Sensory:            C5         C6         C7      C8       T1        (0=absent, 1=impaired, 2=normal, NT=not testable)  R         2            2           2        2         2  L          2            2           2        2         2               L2          L3         L4      L5       S1         (0=absent, 1=impaired, 2=normal, NT=not testable)  R         2            2            2        2        2  L          2            2           2        2         2    LEs:  Calves soft, no TTP b/l  WWP b/l    LABS

## 2025-04-07 NOTE — PROGRESS NOTE ADULT - PROVIDER SPECIALTY LIST ADULT
Internal Medicine
Orthopedics
Internal Medicine
Internal Medicine
Orthopedics
Pain Medicine
Pain Medicine
Orthopedics
Internal Medicine

## 2025-04-11 ENCOUNTER — OUTPATIENT (OUTPATIENT)
Dept: OUTPATIENT SERVICES | Facility: HOSPITAL | Age: 65
LOS: 1 days | End: 2025-04-11
Payer: COMMERCIAL

## 2025-04-11 DIAGNOSIS — I82.402 ACUTE EMBOLISM AND THROMBOSIS OF UNSPECIFIED DEEP VEINS OF LEFT LOWER EXTREMITY: ICD-10-CM

## 2025-04-11 DIAGNOSIS — R60.9 EDEMA, UNSPECIFIED: ICD-10-CM

## 2025-04-11 DIAGNOSIS — Z98.890 OTHER SPECIFIED POSTPROCEDURAL STATES: Chronic | ICD-10-CM

## 2025-04-11 DIAGNOSIS — Z98.51 TUBAL LIGATION STATUS: Chronic | ICD-10-CM

## 2025-04-11 PROBLEM — Z87.19 PERSONAL HISTORY OF OTHER DISEASES OF THE DIGESTIVE SYSTEM: Chronic | Status: ACTIVE | Noted: 2025-03-20

## 2025-04-11 PROBLEM — K80.20 CALCULUS OF GALLBLADDER WITHOUT CHOLECYSTITIS WITHOUT OBSTRUCTION: Chronic | Status: ACTIVE | Noted: 2025-03-20

## 2025-04-11 PROBLEM — N63.0 UNSPECIFIED LUMP IN UNSPECIFIED BREAST: Chronic | Status: ACTIVE | Noted: 2025-03-20

## 2025-04-11 PROBLEM — Z86.69 PERSONAL HISTORY OF OTHER DISEASES OF THE NERVOUS SYSTEM AND SENSE ORGANS: Chronic | Status: ACTIVE | Noted: 2025-03-20

## 2025-04-11 PROCEDURE — 93971 EXTREMITY STUDY: CPT | Mod: 26,LT

## 2025-04-14 PROCEDURE — 73030 X-RAY EXAM OF SHOULDER: CPT | Mod: 26,LT

## 2025-04-18 ENCOUNTER — APPOINTMENT (OUTPATIENT)
Dept: ORTHOPEDIC SURGERY | Facility: CLINIC | Age: 65
End: 2025-04-18
Payer: COMMERCIAL

## 2025-04-18 VITALS — HEIGHT: 66 IN | BODY MASS INDEX: 37.93 KG/M2 | WEIGHT: 236 LBS

## 2025-04-18 DIAGNOSIS — Z98.1 ARTHRODESIS STATUS: ICD-10-CM

## 2025-04-18 PROBLEM — E11.9 TYPE 2 DIABETES MELLITUS WITHOUT COMPLICATIONS: Chronic | Status: ACTIVE | Noted: 2025-03-20

## 2025-04-18 PROCEDURE — 99024 POSTOP FOLLOW-UP VISIT: CPT

## 2025-04-18 PROCEDURE — 72040 X-RAY EXAM NECK SPINE 2-3 VW: CPT

## 2025-04-18 RX ORDER — HYDROMORPHONE HYDROCHLORIDE 2 MG/1
2 TABLET ORAL
Qty: 84 | Refills: 0 | Status: ACTIVE | COMMUNITY
Start: 2025-04-18 | End: 1900-01-01

## 2025-05-02 ENCOUNTER — TRANSCRIPTION ENCOUNTER (OUTPATIENT)
Age: 65
End: 2025-05-02

## 2025-05-07 ENCOUNTER — TRANSCRIPTION ENCOUNTER (OUTPATIENT)
Age: 65
End: 2025-05-07

## 2025-05-16 ENCOUNTER — APPOINTMENT (OUTPATIENT)
Dept: ORTHOPEDIC SURGERY | Facility: CLINIC | Age: 65
End: 2025-05-16
Payer: COMMERCIAL

## 2025-05-16 VITALS — WEIGHT: 236 LBS | HEIGHT: 66 IN | BODY MASS INDEX: 37.93 KG/M2

## 2025-05-16 DIAGNOSIS — Z98.1 ARTHRODESIS STATUS: ICD-10-CM

## 2025-05-16 PROCEDURE — 99024 POSTOP FOLLOW-UP VISIT: CPT

## 2025-05-16 PROCEDURE — 72040 X-RAY EXAM NECK SPINE 2-3 VW: CPT

## 2025-06-20 ENCOUNTER — APPOINTMENT (OUTPATIENT)
Dept: ORTHOPEDIC SURGERY | Facility: CLINIC | Age: 65
End: 2025-06-20
Payer: COMMERCIAL

## 2025-06-20 VITALS — BODY MASS INDEX: 37.93 KG/M2 | HEIGHT: 66 IN | WEIGHT: 236 LBS

## 2025-06-20 PROCEDURE — 99024 POSTOP FOLLOW-UP VISIT: CPT

## 2025-06-20 PROCEDURE — 72040 X-RAY EXAM NECK SPINE 2-3 VW: CPT

## 2025-06-20 RX ORDER — SEMAGLUTIDE 2.68 MG/ML
INJECTION, SOLUTION SUBCUTANEOUS
Refills: 0 | Status: ACTIVE | COMMUNITY

## 2025-08-11 ENCOUNTER — RESULT CHARGE (OUTPATIENT)
Age: 65
End: 2025-08-11

## 2025-08-11 ENCOUNTER — APPOINTMENT (OUTPATIENT)
Dept: ORTHOPEDIC SURGERY | Facility: CLINIC | Age: 65
End: 2025-08-11
Payer: COMMERCIAL

## 2025-08-11 VITALS — HEIGHT: 66 IN | BODY MASS INDEX: 37.93 KG/M2 | WEIGHT: 236 LBS

## 2025-08-11 DIAGNOSIS — M48.062 SPINAL STENOSIS, LUMBAR REGION WITH NEUROGENIC CLAUDICATION: ICD-10-CM

## 2025-08-11 DIAGNOSIS — Z00.00 ENCOUNTER FOR GENERAL ADULT MEDICAL EXAMINATION W/OUT ABNORMAL FINDINGS: ICD-10-CM

## 2025-08-11 DIAGNOSIS — Z98.1 ARTHRODESIS STATUS: ICD-10-CM

## 2025-08-11 PROCEDURE — 72100 X-RAY EXAM L-S SPINE 2/3 VWS: CPT

## 2025-08-11 PROCEDURE — 99203 OFFICE O/P NEW LOW 30 MIN: CPT

## 2025-08-11 PROCEDURE — 72040 X-RAY EXAM NECK SPINE 2-3 VW: CPT

## 2025-08-12 ENCOUNTER — APPOINTMENT (OUTPATIENT)
Dept: MRI IMAGING | Facility: CLINIC | Age: 65
End: 2025-08-12
Payer: COMMERCIAL

## 2025-08-12 PROCEDURE — 72148 MRI LUMBAR SPINE W/O DYE: CPT

## 2025-08-29 ENCOUNTER — APPOINTMENT (OUTPATIENT)
Dept: ORTHOPEDIC SURGERY | Facility: CLINIC | Age: 65
End: 2025-08-29
Payer: COMMERCIAL

## 2025-08-29 VITALS — WEIGHT: 236 LBS | BODY MASS INDEX: 37.93 KG/M2 | HEIGHT: 66 IN

## 2025-08-29 DIAGNOSIS — Z98.1 ARTHRODESIS STATUS: ICD-10-CM

## 2025-08-29 DIAGNOSIS — M48.062 SPINAL STENOSIS, LUMBAR REGION WITH NEUROGENIC CLAUDICATION: ICD-10-CM

## 2025-08-29 DIAGNOSIS — M54.12 RADICULOPATHY, CERVICAL REGION: ICD-10-CM

## 2025-08-29 DIAGNOSIS — M51.369: ICD-10-CM

## 2025-08-29 PROCEDURE — 99213 OFFICE O/P EST LOW 20 MIN: CPT

## 2025-09-10 ENCOUNTER — APPOINTMENT (OUTPATIENT)
Dept: PAIN MANAGEMENT | Facility: CLINIC | Age: 65
End: 2025-09-10
Payer: COMMERCIAL

## 2025-09-10 VITALS — BODY MASS INDEX: 35.2 KG/M2 | WEIGHT: 219 LBS | HEIGHT: 66 IN

## 2025-09-10 DIAGNOSIS — M54.12 RADICULOPATHY, CERVICAL REGION: ICD-10-CM

## 2025-09-10 DIAGNOSIS — I10 ESSENTIAL (PRIMARY) HYPERTENSION: ICD-10-CM

## 2025-09-10 DIAGNOSIS — M48.062 SPINAL STENOSIS, LUMBAR REGION WITH NEUROGENIC CLAUDICATION: ICD-10-CM

## 2025-09-10 DIAGNOSIS — M51.369: ICD-10-CM

## 2025-09-10 PROCEDURE — 99214 OFFICE O/P EST MOD 30 MIN: CPT

## 2025-09-19 ENCOUNTER — NON-APPOINTMENT (OUTPATIENT)
Age: 65
End: 2025-09-19

## (undated) DEVICE — PACK NEURO

## (undated) DEVICE — ELCTR GROUNDING PAD ADULT COVIDIEN

## (undated) DEVICE — BLADE SURGICAL #10 STAINLESS

## (undated) DEVICE — NDL SPINAL 18G X 3.5" (PINK)

## (undated) DEVICE — DRAPE IOBAN 23" X 23"

## (undated) DEVICE — STAPLER SKIN MULTI DIRECTION W35

## (undated) DEVICE — PREP DURAPREP 26CC

## (undated) DEVICE — Device

## (undated) DEVICE — DRAPE SURGICAL #1010

## (undated) DEVICE — ELCTR BOVIE PENCIL BLADE 10FT

## (undated) DEVICE — DRILL BIT NUVASIVE VUE POINT

## (undated) DEVICE — ELCTR BOVIE TIP BLADE INSULATED 4" EDGE

## (undated) DEVICE — DRSG COMBINE 5 X 9"

## (undated) DEVICE — DRAIN RESERVOIR FOR JACKSON PRATT 100CC CARDINAL

## (undated) DEVICE — DRAPE MINOR PROCEDURE

## (undated) DEVICE — STRYKER BONE MILL & MEDIUM BLADE

## (undated) DEVICE — DRAPE BACK TABLE COVER 44X90"

## (undated) DEVICE — POSITIONER JACKSON TABLE CHEST, HIP, THIGH PADS, ARM CRADLE

## (undated) DEVICE — DRAPE COVER SNAP 36X30"

## (undated) DEVICE — ELCTR BOVIE TIP BLADE INSULATED 2.75" EDGE

## (undated) DEVICE — SUT VICRYL PLUS 0 27" OS-6 UNDYED

## (undated) DEVICE — GLV 9 PROTEXIS (WHITE)

## (undated) DEVICE — ELCTR BOVIE PENCIL HANDPIECE ROCKER SWITCH 15FT

## (undated) DEVICE — DRAPE INSTRUMENT POUCH 6.75" X 11"

## (undated) DEVICE — NDL HYPO SAFE 21G X 1.5" (GREEN)

## (undated) DEVICE — SUT VICRYL PLUS 2-0 18" CP-2 UNDYED (POP-OFF)

## (undated) DEVICE — DRAPE C ARM C-ARMOUR

## (undated) DEVICE — DRSG TEGADERM 4 X 4.75"

## (undated) DEVICE — ELCTR AQUAMANTYS BIPOLAR SEALER 6.0

## (undated) DEVICE — MIDAS REX MR7 LUBRICANT DIFFUSER CARTRIDGE

## (undated) DEVICE — FOLEY CATH 2-WAY 16FR 5CC SILICONE

## (undated) DEVICE — MIDAS REX MR8 MATCH HEAD FLUTED LG BORE 3MM X 14CM

## (undated) DEVICE — VENODYNE/SCD SLEEVE CALF MEDIUM

## (undated) DEVICE — POSITIONER FOAM EGG CRATE ULNAR 2PCS (PINK)

## (undated) DEVICE — CERVICAL COLLAR ZIMMER PHILA MED

## (undated) DEVICE — TUBING FOR SMOKE EVACUATOR (PURPLE END)

## (undated) DEVICE — DRAPE TOWEL BLUE 17" X 24"

## (undated) DEVICE — SUT VICRYL PLUS 2-0 27" FS-1 UNDYED

## (undated) DEVICE — POSITIONER PINK PAD PIGAZZI SYSTEM W ARM PROTECTOR

## (undated) DEVICE — ELCTR BOVIE PENCIL SMOKE EVACUATION

## (undated) DEVICE — DRSG TELFA 3 X 8

## (undated) DEVICE — BIPOLAR FORCEP HENSLER BAYONET 10" X 1MM (YELLOW)

## (undated) DEVICE — DRSG CURITY GAUZE SPONGE 4 X 4" 12-PLY

## (undated) DEVICE — FOLEY TRAY 14FR 5CC LF UMETER CLOSED

## (undated) DEVICE — SPONGE DISSECTOR PEANUT

## (undated) DEVICE — DRAPE 3/4 SHEET 52X76"

## (undated) DEVICE — STRYKER BONE MILL PREP & CARTRIDGE

## (undated) DEVICE — SOL IRR POUR NS 0.9% 1500ML

## (undated) DEVICE — TAPE SILK 3"

## (undated) DEVICE — SOL IRR POUR H2O 1500ML

## (undated) DEVICE — SUT VICRYL PLUS 0 18" CT-1 UNDYED (POP-OFF)

## (undated) DEVICE — LIJ-KMEDIC KERRISON RONGUER: Type: DURABLE MEDICAL EQUIPMENT

## (undated) DEVICE — GLV 7.5 PROTEXIS (WHITE)